# Patient Record
Sex: MALE | Race: WHITE | URBAN - METROPOLITAN AREA
[De-identification: names, ages, dates, MRNs, and addresses within clinical notes are randomized per-mention and may not be internally consistent; named-entity substitution may affect disease eponyms.]

---

## 2021-02-20 ENCOUNTER — IMMUNIZATION (OUTPATIENT)
Dept: PRIMARY CARE CLINIC | Age: 76
End: 2021-02-20
Payer: MEDICARE

## 2021-02-20 PROCEDURE — 0001A COVID-19, PFIZER VACCINE 30MCG/0.3ML DOSE: CPT | Performed by: NURSE PRACTITIONER

## 2021-02-20 PROCEDURE — 91300 COVID-19, PFIZER VACCINE 30MCG/0.3ML DOSE: CPT | Performed by: NURSE PRACTITIONER

## 2021-03-11 ENCOUNTER — IMMUNIZATION (OUTPATIENT)
Dept: PRIMARY CARE CLINIC | Age: 76
End: 2021-03-11
Payer: MEDICARE

## 2021-03-11 PROCEDURE — 91300 COVID-19, PFIZER VACCINE 30MCG/0.3ML DOSE: CPT | Performed by: NURSE PRACTITIONER

## 2021-03-11 PROCEDURE — 0002A COVID-19, PFIZER VACCINE 30MCG/0.3ML DOSE: CPT | Performed by: NURSE PRACTITIONER

## 2024-05-28 ENCOUNTER — HOSPITAL ENCOUNTER (INPATIENT)
Age: 79
LOS: 3 days | Discharge: HOME | DRG: 686 | End: 2024-06-01
Attending: EMERGENCY MEDICINE | Admitting: INTERNAL MEDICINE
Payer: MEDICARE

## 2024-05-28 ENCOUNTER — APPOINTMENT (OUTPATIENT)
Dept: CT IMAGING | Age: 79
DRG: 686 | End: 2024-05-28
Payer: MEDICARE

## 2024-05-28 DIAGNOSIS — D64.9 SYMPTOMATIC ANEMIA: Primary | ICD-10-CM

## 2024-05-28 DIAGNOSIS — N28.89 RENAL MASS: ICD-10-CM

## 2024-05-28 LAB
ALBUMIN SERPL-MCNC: 2.6 G/DL (ref 3.5–5.2)
ALP SERPL-CCNC: 194 U/L (ref 40–129)
ALT SERPL-CCNC: 14 U/L (ref 0–40)
ANION GAP SERPL CALCULATED.3IONS-SCNC: 12 MMOL/L (ref 7–16)
AST SERPL-CCNC: 29 U/L (ref 0–39)
BASOPHILS # BLD: 0 K/UL (ref 0–0.2)
BASOPHILS NFR BLD: 0 % (ref 0–2)
BILIRUB SERPL-MCNC: 0.6 MG/DL (ref 0–1.2)
BNP SERPL-MCNC: 3367 PG/ML (ref 0–450)
BUN SERPL-MCNC: 17 MG/DL (ref 6–23)
CALCIUM SERPL-MCNC: 10 MG/DL (ref 8.6–10.2)
CHLORIDE SERPL-SCNC: 97 MMOL/L (ref 98–107)
CO2 SERPL-SCNC: 23 MMOL/L (ref 22–29)
CREAT SERPL-MCNC: 1 MG/DL (ref 0.7–1.2)
EOSINOPHIL # BLD: 0.08 K/UL (ref 0.05–0.5)
EOSINOPHILS RELATIVE PERCENT: 1 % (ref 0–6)
ERYTHROCYTE [DISTWIDTH] IN BLOOD BY AUTOMATED COUNT: 18.9 % (ref 11.5–15)
GFR, ESTIMATED: 74 ML/MIN/1.73M2
GLUCOSE SERPL-MCNC: 114 MG/DL (ref 74–99)
HCT VFR BLD AUTO: 27.7 % (ref 37–54)
HGB BLD-MCNC: 8.3 G/DL (ref 12.5–16.5)
LACTATE BLDV-SCNC: 2 MMOL/L (ref 0.5–2.2)
LIPASE SERPL-CCNC: 15 U/L (ref 13–60)
LYMPHOCYTES NFR BLD: 0.62 K/UL (ref 1.5–4)
LYMPHOCYTES RELATIVE PERCENT: 7 % (ref 20–42)
MAGNESIUM SERPL-MCNC: 1.7 MG/DL (ref 1.6–2.6)
MCH RBC QN AUTO: 26.8 PG (ref 26–35)
MCHC RBC AUTO-ENTMCNC: 30 G/DL (ref 32–34.5)
MCV RBC AUTO: 89.4 FL (ref 80–99.9)
MONOCYTES NFR BLD: 0.39 K/UL (ref 0.1–0.95)
MONOCYTES NFR BLD: 4 % (ref 2–12)
NEUTROPHILS NFR BLD: 88 % (ref 43–80)
NEUTS SEG NFR BLD: 7.82 K/UL (ref 1.8–7.3)
PLATELET CONFIRMATION: NORMAL
PLATELET, FLUORESCENCE: 233 K/UL (ref 130–450)
PMV BLD AUTO: 9.3 FL (ref 7–12)
POTASSIUM SERPL-SCNC: 5 MMOL/L (ref 3.5–5)
PROT SERPL-MCNC: 5.5 G/DL (ref 6.4–8.3)
RBC # BLD AUTO: 3.1 M/UL (ref 3.8–5.8)
RBC # BLD: ABNORMAL 10*6/UL
SODIUM SERPL-SCNC: 132 MMOL/L (ref 132–146)
TROPONIN I SERPL HS-MCNC: 63 NG/L (ref 0–11)
TROPONIN I SERPL HS-MCNC: 70 NG/L (ref 0–11)
WBC OTHER # BLD: 8.9 K/UL (ref 4.5–11.5)

## 2024-05-28 PROCEDURE — 0TB03ZX EXCISION OF RIGHT KIDNEY, PERCUTANEOUS APPROACH, DIAGNOSTIC: ICD-10-PCS | Performed by: RADIOLOGY

## 2024-05-28 PROCEDURE — 83605 ASSAY OF LACTIC ACID: CPT

## 2024-05-28 PROCEDURE — 71275 CT ANGIOGRAPHY CHEST: CPT

## 2024-05-28 PROCEDURE — 83690 ASSAY OF LIPASE: CPT

## 2024-05-28 PROCEDURE — 99285 EMERGENCY DEPT VISIT HI MDM: CPT

## 2024-05-28 PROCEDURE — 80053 COMPREHEN METABOLIC PANEL: CPT

## 2024-05-28 PROCEDURE — 86901 BLOOD TYPING SEROLOGIC RH(D): CPT

## 2024-05-28 PROCEDURE — 84153 ASSAY OF PSA TOTAL: CPT

## 2024-05-28 PROCEDURE — 85025 COMPLETE CBC W/AUTO DIFF WBC: CPT

## 2024-05-28 PROCEDURE — 74177 CT ABD & PELVIS W/CONTRAST: CPT

## 2024-05-28 PROCEDURE — 6360000004 HC RX CONTRAST MEDICATION: Performed by: RADIOLOGY

## 2024-05-28 PROCEDURE — 86923 COMPATIBILITY TEST ELECTRIC: CPT

## 2024-05-28 PROCEDURE — 86900 BLOOD TYPING SEROLOGIC ABO: CPT

## 2024-05-28 PROCEDURE — 83880 ASSAY OF NATRIURETIC PEPTIDE: CPT

## 2024-05-28 PROCEDURE — 70450 CT HEAD/BRAIN W/O DYE: CPT

## 2024-05-28 PROCEDURE — 93005 ELECTROCARDIOGRAM TRACING: CPT

## 2024-05-28 PROCEDURE — 2580000003 HC RX 258

## 2024-05-28 PROCEDURE — 83735 ASSAY OF MAGNESIUM: CPT

## 2024-05-28 PROCEDURE — 84484 ASSAY OF TROPONIN QUANT: CPT

## 2024-05-28 PROCEDURE — 86850 RBC ANTIBODY SCREEN: CPT

## 2024-05-28 PROCEDURE — 96360 HYDRATION IV INFUSION INIT: CPT

## 2024-05-28 RX ORDER — 0.9 % SODIUM CHLORIDE 0.9 %
1000 INTRAVENOUS SOLUTION INTRAVENOUS ONCE
Status: DISCONTINUED | OUTPATIENT
Start: 2024-05-28 | End: 2024-05-28

## 2024-05-28 RX ORDER — SODIUM CHLORIDE 9 MG/ML
INJECTION, SOLUTION INTRAVENOUS PRN
Status: DISCONTINUED | OUTPATIENT
Start: 2024-05-28 | End: 2024-06-01 | Stop reason: HOSPADM

## 2024-05-28 RX ORDER — SODIUM CHLORIDE 9 MG/ML
INJECTION, SOLUTION INTRAVENOUS
Status: COMPLETED
Start: 2024-05-28 | End: 2024-05-28

## 2024-05-28 RX ORDER — 0.9 % SODIUM CHLORIDE 0.9 %
500 INTRAVENOUS SOLUTION INTRAVENOUS ONCE
Status: COMPLETED | OUTPATIENT
Start: 2024-05-28 | End: 2024-05-28

## 2024-05-28 RX ADMIN — SODIUM CHLORIDE 500 ML: 9 INJECTION, SOLUTION INTRAVENOUS at 22:12

## 2024-05-28 RX ADMIN — IOPAMIDOL 70 ML: 755 INJECTION, SOLUTION INTRAVENOUS at 23:04

## 2024-05-28 RX ADMIN — Medication 500 ML: at 22:12

## 2024-05-28 ASSESSMENT — ENCOUNTER SYMPTOMS
EYE REDNESS: 0
BACK PAIN: 0
PHOTOPHOBIA: 0
EYE DISCHARGE: 0
TROUBLE SWALLOWING: 0
DIARRHEA: 0
WHEEZING: 0
NAUSEA: 1
ABDOMINAL PAIN: 0
VOMITING: 1
SHORTNESS OF BREATH: 0
COUGH: 0
SORE THROAT: 0
VOICE CHANGE: 0
BLOOD IN STOOL: 0
RHINORRHEA: 0

## 2024-05-28 ASSESSMENT — PAIN - FUNCTIONAL ASSESSMENT: PAIN_FUNCTIONAL_ASSESSMENT: NONE - DENIES PAIN

## 2024-05-28 ASSESSMENT — LIFESTYLE VARIABLES: HOW OFTEN DO YOU HAVE A DRINK CONTAINING ALCOHOL: NEVER

## 2024-05-29 PROBLEM — D64.9 SYMPTOMATIC ANEMIA: Status: ACTIVE | Noted: 2024-05-29

## 2024-05-29 LAB
ALBUMIN SERPL-MCNC: 2.3 G/DL (ref 3.5–5.2)
ALP SERPL-CCNC: 173 U/L (ref 40–129)
ALT SERPL-CCNC: 10 U/L (ref 0–40)
ANION GAP SERPL CALCULATED.3IONS-SCNC: 12 MMOL/L (ref 7–16)
AST SERPL-CCNC: 19 U/L (ref 0–39)
BACTERIA URNS QL MICRO: ABNORMAL
BASOPHILS # BLD: 0.03 K/UL (ref 0–0.2)
BASOPHILS NFR BLD: 0 % (ref 0–2)
BILIRUB SERPL-MCNC: 1 MG/DL (ref 0–1.2)
BILIRUB UR QL STRIP: ABNORMAL
BUN SERPL-MCNC: 16 MG/DL (ref 6–23)
CALCIUM SERPL-MCNC: 9.5 MG/DL (ref 8.6–10.2)
CEA SERPL-MCNC: 3 NG/ML (ref 0–5.2)
CHLORIDE SERPL-SCNC: 102 MMOL/L (ref 98–107)
CLARITY UR: ABNORMAL
CO2 SERPL-SCNC: 22 MMOL/L (ref 22–29)
COLOR UR: ABNORMAL
CREAT SERPL-MCNC: 1 MG/DL (ref 0.7–1.2)
EKG ATRIAL RATE: 104 BPM
EKG P AXIS: 65 DEGREES
EKG P-R INTERVAL: 162 MS
EKG Q-T INTERVAL: 330 MS
EKG QRS DURATION: 80 MS
EKG QTC CALCULATION (BAZETT): 433 MS
EKG R AXIS: 27 DEGREES
EKG T AXIS: 66 DEGREES
EKG VENTRICULAR RATE: 104 BPM
EOSINOPHIL # BLD: 0.15 K/UL (ref 0.05–0.5)
EOSINOPHILS RELATIVE PERCENT: 2 % (ref 0–6)
EPI CELLS #/AREA URNS HPF: ABNORMAL /HPF
ERYTHROCYTE [DISTWIDTH] IN BLOOD BY AUTOMATED COUNT: 17.4 % (ref 11.5–15)
ERYTHROCYTE [SEDIMENTATION RATE] IN BLOOD BY WESTERGREN METHOD: 28 MM/HR (ref 0–15)
FERRITIN SERPL-MCNC: 2886 NG/ML
FOLATE SERPL-MCNC: 16 NG/ML (ref 4.8–24.2)
GFR, ESTIMATED: 77 ML/MIN/1.73M2
GLUCOSE BLD-MCNC: 106 MG/DL (ref 74–99)
GLUCOSE BLD-MCNC: 88 MG/DL (ref 74–99)
GLUCOSE SERPL-MCNC: 86 MG/DL (ref 74–99)
GLUCOSE UR STRIP-MCNC: NEGATIVE MG/DL
HCT VFR BLD AUTO: 28.1 % (ref 37–54)
HCT VFR BLD AUTO: 28.5 % (ref 37–54)
HCT VFR BLD AUTO: 31.8 % (ref 37–54)
HGB BLD-MCNC: 9 G/DL (ref 12.5–16.5)
HGB BLD-MCNC: 9.2 G/DL (ref 12.5–16.5)
HGB BLD-MCNC: 9.9 G/DL (ref 12.5–16.5)
HGB UR QL STRIP.AUTO: ABNORMAL
IMM GRANULOCYTES # BLD AUTO: 0.07 K/UL (ref 0–0.58)
IMM GRANULOCYTES NFR BLD: 1 % (ref 0–5)
INR PPP: 1.3
IRON SATN MFR SERPL: 43 % (ref 20–55)
IRON SERPL-MCNC: 38 UG/DL (ref 59–158)
KETONES UR STRIP-MCNC: ABNORMAL MG/DL
LEUKOCYTE ESTERASE UR QL STRIP: NEGATIVE
LYMPHOCYTES NFR BLD: 1 K/UL (ref 1.5–4)
LYMPHOCYTES RELATIVE PERCENT: 11 % (ref 20–42)
MAGNESIUM SERPL-MCNC: 1.7 MG/DL (ref 1.6–2.6)
MCH RBC QN AUTO: 28.5 PG (ref 26–35)
MCHC RBC AUTO-ENTMCNC: 32.3 G/DL (ref 32–34.5)
MCV RBC AUTO: 88.2 FL (ref 80–99.9)
MONOCYTES NFR BLD: 1.01 K/UL (ref 0.1–0.95)
MONOCYTES NFR BLD: 11 % (ref 2–12)
NEUTROPHILS NFR BLD: 75 % (ref 43–80)
NEUTS SEG NFR BLD: 6.57 K/UL (ref 1.8–7.3)
NITRITE UR QL STRIP: POSITIVE
PH UR STRIP: 6.5 [PH] (ref 5–9)
PHOSPHATE SERPL-MCNC: 2.5 MG/DL (ref 2.5–4.5)
PLATELET # BLD AUTO: 194 K/UL (ref 130–450)
PMV BLD AUTO: 9.4 FL (ref 7–12)
POTASSIUM SERPL-SCNC: 4.2 MMOL/L (ref 3.5–5)
PROCALCITONIN SERPL-MCNC: 0.38 NG/ML (ref 0–0.08)
PROT SERPL-MCNC: 5 G/DL (ref 6.4–8.3)
PROT UR STRIP-MCNC: 30 MG/DL
PROTHROMBIN TIME: 14.6 SEC (ref 9.3–12.4)
PSA SERPL-MCNC: 0.6 NG/ML (ref 0–4)
RBC # BLD AUTO: 3.23 M/UL (ref 3.8–5.8)
RBC #/AREA URNS HPF: ABNORMAL /HPF
SODIUM SERPL-SCNC: 136 MMOL/L (ref 132–146)
SP GR UR STRIP: 1.01 (ref 1–1.03)
T4 FREE SERPL-MCNC: 1.3 NG/DL (ref 0.9–1.7)
TIBC SERPL-MCNC: 88 UG/DL (ref 250–450)
TSH SERPL DL<=0.05 MIU/L-ACNC: 4.1 UIU/ML (ref 0.27–4.2)
UROBILINOGEN UR STRIP-ACNC: 1 EU/DL (ref 0–1)
VIT B12 SERPL-MCNC: 721 PG/ML (ref 211–946)
WBC #/AREA URNS HPF: ABNORMAL /HPF
WBC OTHER # BLD: 8.8 K/UL (ref 4.5–11.5)

## 2024-05-29 PROCEDURE — 83735 ASSAY OF MAGNESIUM: CPT

## 2024-05-29 PROCEDURE — 6370000000 HC RX 637 (ALT 250 FOR IP)

## 2024-05-29 PROCEDURE — C9113 INJ PANTOPRAZOLE SODIUM, VIA: HCPCS | Performed by: INTERNAL MEDICINE

## 2024-05-29 PROCEDURE — 81001 URINALYSIS AUTO W/SCOPE: CPT

## 2024-05-29 PROCEDURE — 87088 URINE BACTERIA CULTURE: CPT

## 2024-05-29 PROCEDURE — 82746 ASSAY OF FOLIC ACID SERUM: CPT

## 2024-05-29 PROCEDURE — 36415 COLL VENOUS BLD VENIPUNCTURE: CPT

## 2024-05-29 PROCEDURE — 6370000000 HC RX 637 (ALT 250 FOR IP): Performed by: INTERNAL MEDICINE

## 2024-05-29 PROCEDURE — 85018 HEMOGLOBIN: CPT

## 2024-05-29 PROCEDURE — 93010 ELECTROCARDIOGRAM REPORT: CPT | Performed by: INTERNAL MEDICINE

## 2024-05-29 PROCEDURE — 84443 ASSAY THYROID STIM HORMONE: CPT

## 2024-05-29 PROCEDURE — 84145 PROCALCITONIN (PCT): CPT

## 2024-05-29 PROCEDURE — 36430 TRANSFUSION BLD/BLD COMPNT: CPT

## 2024-05-29 PROCEDURE — 85652 RBC SED RATE AUTOMATED: CPT

## 2024-05-29 PROCEDURE — 82962 GLUCOSE BLOOD TEST: CPT

## 2024-05-29 PROCEDURE — 85025 COMPLETE CBC W/AUTO DIFF WBC: CPT

## 2024-05-29 PROCEDURE — 82607 VITAMIN B-12: CPT

## 2024-05-29 PROCEDURE — 85014 HEMATOCRIT: CPT

## 2024-05-29 PROCEDURE — 82378 CARCINOEMBRYONIC ANTIGEN: CPT

## 2024-05-29 PROCEDURE — 6360000002 HC RX W HCPCS: Performed by: INTERNAL MEDICINE

## 2024-05-29 PROCEDURE — 83540 ASSAY OF IRON: CPT

## 2024-05-29 PROCEDURE — 84100 ASSAY OF PHOSPHORUS: CPT

## 2024-05-29 PROCEDURE — 2580000003 HC RX 258: Performed by: INTERNAL MEDICINE

## 2024-05-29 PROCEDURE — 87086 URINE CULTURE/COLONY COUNT: CPT

## 2024-05-29 PROCEDURE — 84439 ASSAY OF FREE THYROXINE: CPT

## 2024-05-29 PROCEDURE — 88112 CYTOPATH CELL ENHANCE TECH: CPT

## 2024-05-29 PROCEDURE — 80053 COMPREHEN METABOLIC PANEL: CPT

## 2024-05-29 PROCEDURE — 2580000003 HC RX 258

## 2024-05-29 PROCEDURE — 82728 ASSAY OF FERRITIN: CPT

## 2024-05-29 PROCEDURE — 85610 PROTHROMBIN TIME: CPT

## 2024-05-29 PROCEDURE — P9016 RBC LEUKOCYTES REDUCED: HCPCS

## 2024-05-29 PROCEDURE — 83550 IRON BINDING TEST: CPT

## 2024-05-29 PROCEDURE — 6360000002 HC RX W HCPCS

## 2024-05-29 PROCEDURE — 2060000000 HC ICU INTERMEDIATE R&B

## 2024-05-29 RX ORDER — DOXAZOSIN MESYLATE 1 MG/1
2 TABLET ORAL DAILY
Status: DISCONTINUED | OUTPATIENT
Start: 2024-05-29 | End: 2024-06-01 | Stop reason: HOSPADM

## 2024-05-29 RX ORDER — ELECTROLYTES/DEXTROSE
1 SOLUTION, ORAL ORAL PRN
Status: DISCONTINUED | OUTPATIENT
Start: 2024-05-29 | End: 2024-06-01 | Stop reason: HOSPADM

## 2024-05-29 RX ORDER — ENOXAPARIN SODIUM 100 MG/ML
30 INJECTION SUBCUTANEOUS 2 TIMES DAILY
Status: DISCONTINUED | OUTPATIENT
Start: 2024-05-29 | End: 2024-06-01 | Stop reason: HOSPADM

## 2024-05-29 RX ORDER — SODIUM CHLORIDE 0.9 % (FLUSH) 0.9 %
5-40 SYRINGE (ML) INJECTION PRN
Status: DISCONTINUED | OUTPATIENT
Start: 2024-05-29 | End: 2024-06-01 | Stop reason: HOSPADM

## 2024-05-29 RX ORDER — POLYETHYLENE GLYCOL 3350 17 G/17G
17 POWDER, FOR SOLUTION ORAL DAILY PRN
Status: DISCONTINUED | OUTPATIENT
Start: 2024-05-29 | End: 2024-06-01 | Stop reason: HOSPADM

## 2024-05-29 RX ORDER — INSULIN LISPRO 100 [IU]/ML
3 INJECTION, SUSPENSION SUBCUTANEOUS NIGHTLY
Status: DISCONTINUED | OUTPATIENT
Start: 2024-05-29 | End: 2024-06-01 | Stop reason: HOSPADM

## 2024-05-29 RX ORDER — MAGNESIUM SULFATE IN WATER 40 MG/ML
2000 INJECTION, SOLUTION INTRAVENOUS PRN
Status: DISCONTINUED | OUTPATIENT
Start: 2024-05-29 | End: 2024-06-01 | Stop reason: HOSPADM

## 2024-05-29 RX ORDER — GLUCAGON 1 MG/ML
1 KIT INJECTION PRN
Status: DISCONTINUED | OUTPATIENT
Start: 2024-05-29 | End: 2024-06-01 | Stop reason: HOSPADM

## 2024-05-29 RX ORDER — LOSARTAN POTASSIUM 50 MG/1
50 TABLET ORAL DAILY
Status: DISCONTINUED | OUTPATIENT
Start: 2024-05-29 | End: 2024-06-01 | Stop reason: HOSPADM

## 2024-05-29 RX ORDER — INSULIN LISPRO 100 [IU]/ML
4 INJECTION, SUSPENSION SUBCUTANEOUS 2 TIMES DAILY WITH MEALS
Status: DISCONTINUED | OUTPATIENT
Start: 2024-05-29 | End: 2024-06-01 | Stop reason: HOSPADM

## 2024-05-29 RX ORDER — ENOXAPARIN SODIUM 100 MG/ML
40 INJECTION SUBCUTANEOUS DAILY
Status: DISCONTINUED | OUTPATIENT
Start: 2024-05-29 | End: 2024-05-29

## 2024-05-29 RX ORDER — LOSARTAN POTASSIUM AND HYDROCHLOROTHIAZIDE 12.5; 5 MG/1; MG/1
1 TABLET ORAL DAILY
Status: DISCONTINUED | OUTPATIENT
Start: 2024-05-29 | End: 2024-05-29

## 2024-05-29 RX ORDER — POTASSIUM CHLORIDE 7.45 MG/ML
10 INJECTION INTRAVENOUS PRN
Status: DISCONTINUED | OUTPATIENT
Start: 2024-05-29 | End: 2024-06-01 | Stop reason: HOSPADM

## 2024-05-29 RX ORDER — SODIUM CHLORIDE 9 MG/ML
INJECTION, SOLUTION INTRAVENOUS PRN
Status: DISCONTINUED | OUTPATIENT
Start: 2024-05-29 | End: 2024-06-01 | Stop reason: HOSPADM

## 2024-05-29 RX ORDER — SODIUM CHLORIDE 0.9 % (FLUSH) 0.9 %
5-40 SYRINGE (ML) INJECTION EVERY 12 HOURS SCHEDULED
Status: DISCONTINUED | OUTPATIENT
Start: 2024-05-29 | End: 2024-06-01 | Stop reason: HOSPADM

## 2024-05-29 RX ORDER — PANTOPRAZOLE SODIUM 40 MG/10ML
40 INJECTION, POWDER, LYOPHILIZED, FOR SOLUTION INTRAVENOUS 2 TIMES DAILY
Status: DISCONTINUED | OUTPATIENT
Start: 2024-05-29 | End: 2024-06-01 | Stop reason: HOSPADM

## 2024-05-29 RX ORDER — ACETAMINOPHEN 650 MG/1
650 SUPPOSITORY RECTAL EVERY 6 HOURS PRN
Status: DISCONTINUED | OUTPATIENT
Start: 2024-05-29 | End: 2024-06-01 | Stop reason: HOSPADM

## 2024-05-29 RX ORDER — SODIUM CHLORIDE 9 MG/ML
INJECTION, SOLUTION INTRAVENOUS CONTINUOUS
Status: DISCONTINUED | OUTPATIENT
Start: 2024-05-29 | End: 2024-06-01 | Stop reason: HOSPADM

## 2024-05-29 RX ORDER — COLCHICINE 0.6 MG/1
0.6 TABLET ORAL DAILY
Status: DISCONTINUED | OUTPATIENT
Start: 2024-05-29 | End: 2024-06-01 | Stop reason: HOSPADM

## 2024-05-29 RX ORDER — ATORVASTATIN CALCIUM 10 MG/1
10 TABLET, FILM COATED ORAL DAILY
Status: DISCONTINUED | OUTPATIENT
Start: 2024-05-29 | End: 2024-06-01 | Stop reason: HOSPADM

## 2024-05-29 RX ORDER — ALLOPURINOL 100 MG/1
100 TABLET ORAL DAILY
Status: DISCONTINUED | OUTPATIENT
Start: 2024-05-29 | End: 2024-06-01 | Stop reason: HOSPADM

## 2024-05-29 RX ORDER — POTASSIUM CHLORIDE 20 MEQ/1
40 TABLET, EXTENDED RELEASE ORAL PRN
Status: DISCONTINUED | OUTPATIENT
Start: 2024-05-29 | End: 2024-06-01 | Stop reason: HOSPADM

## 2024-05-29 RX ORDER — DEXTROSE MONOHYDRATE 100 MG/ML
INJECTION, SOLUTION INTRAVENOUS CONTINUOUS PRN
Status: DISCONTINUED | OUTPATIENT
Start: 2024-05-29 | End: 2024-06-01 | Stop reason: HOSPADM

## 2024-05-29 RX ORDER — LEVOFLOXACIN 5 MG/ML
250 INJECTION, SOLUTION INTRAVENOUS EVERY 24 HOURS
Status: DISCONTINUED | OUTPATIENT
Start: 2024-05-29 | End: 2024-06-01 | Stop reason: HOSPADM

## 2024-05-29 RX ORDER — ACETAMINOPHEN 325 MG/1
650 TABLET ORAL EVERY 6 HOURS PRN
Status: DISCONTINUED | OUTPATIENT
Start: 2024-05-29 | End: 2024-06-01 | Stop reason: HOSPADM

## 2024-05-29 RX ORDER — METOCLOPRAMIDE HYDROCHLORIDE 5 MG/5ML
10 SOLUTION ORAL 3 TIMES DAILY
COMMUNITY
Start: 2024-05-17

## 2024-05-29 RX ORDER — HYDROCHLOROTHIAZIDE 12.5 MG/1
12.5 TABLET ORAL DAILY
Status: DISCONTINUED | OUTPATIENT
Start: 2024-05-29 | End: 2024-06-01 | Stop reason: HOSPADM

## 2024-05-29 RX ORDER — MELOXICAM 7.5 MG/1
7.5 TABLET ORAL DAILY
Status: DISCONTINUED | OUTPATIENT
Start: 2024-05-29 | End: 2024-06-01 | Stop reason: HOSPADM

## 2024-05-29 RX ADMIN — SODIUM CHLORIDE, PRESERVATIVE FREE 10 ML: 5 INJECTION INTRAVENOUS at 21:30

## 2024-05-29 RX ADMIN — PANTOPRAZOLE SODIUM 40 MG: 40 INJECTION, POWDER, FOR SOLUTION INTRAVENOUS at 06:35

## 2024-05-29 RX ADMIN — PANTOPRAZOLE SODIUM 40 MG: 40 INJECTION, POWDER, FOR SOLUTION INTRAVENOUS at 11:19

## 2024-05-29 RX ADMIN — DOXAZOSIN 2 MG: 1 TABLET ORAL at 15:31

## 2024-05-29 RX ADMIN — PANTOPRAZOLE SODIUM 40 MG: 40 INJECTION, POWDER, FOR SOLUTION INTRAVENOUS at 21:29

## 2024-05-29 RX ADMIN — SODIUM CHLORIDE: 9 INJECTION, SOLUTION INTRAVENOUS at 08:48

## 2024-05-29 RX ADMIN — Medication 1 EACH: at 16:59

## 2024-05-29 RX ADMIN — COLCHICINE 0.3 MG: 0.6 TABLET, FILM COATED ORAL at 08:51

## 2024-05-29 RX ADMIN — ENOXAPARIN SODIUM 30 MG: 100 INJECTION SUBCUTANEOUS at 21:29

## 2024-05-29 RX ADMIN — METOPROLOL TARTRATE 25 MG: 25 TABLET, FILM COATED ORAL at 21:30

## 2024-05-29 RX ADMIN — INSULIN LISPRO 4 UNITS: 100 INJECTION, SUSPENSION SUBCUTANEOUS at 16:56

## 2024-05-29 NOTE — PLAN OF CARE
Problem: Discharge Planning  Goal: Discharge to home or other facility with appropriate resources  Outcome: Progressing  Flowsheets (Taken 5/29/2024 1806)  Discharge to home or other facility with appropriate resources:   Identify barriers to discharge with patient and caregiver   Arrange for needed discharge resources and transportation as appropriate   Identify discharge learning needs (meds, wound care, etc)     Problem: Safety - Adult  Goal: Free from fall injury  Outcome: Progressing  Flowsheets (Taken 5/29/2024 1806)  Free From Fall Injury:   Instruct family/caregiver on patient safety   Based on caregiver fall risk screen, instruct family/caregiver to ask for assistance with transferring infant if caregiver noted to have fall risk factors     Problem: ABCDS Injury Assessment  Goal: Absence of physical injury  Outcome: Progressing  Flowsheets (Taken 5/29/2024 1806)  Absence of Physical Injury: Implement safety measures based on patient assessment

## 2024-05-29 NOTE — H&P
Department of Internal Medicine  History and Physical    PCP: Emiliano Schroeder DO  Admitting Physician: Dr. De La O/Nick  Consultants:   Date of Service: 5/28/2024    CHIEF COMPLAINT:  lightheadedness    HISTORY OF PRESENT ILLNESS:      Patient is 78-year-old male who presented to the ED with lightheadedness and dizziness.  Patient has a history of anemia.  States that recently he has been following with [facility] for anemia.  States that his H&H/hemoglobin was around 5 about 3 months ago.  He received iron infusions and his H&H improved to around 6.8.  However he is unsure of his most recent H&H.  He has followed up with GI for anemia as well as other GI issues.  He states that he had EGD colonoscopy and capsule endoscopy.  States that he has been having trouble with oral intake.  States that if he eats more than 2 meals he is unable to keep food down.  States he has been diagnosed with gastroparesis versus disorder of pyloric sphincter.  He recently stopped NSAIDs and aspirin about 2 weeks ago as it may have been causing his underlying issues.  He has been prescribed Reglan.  He does admit to right lower quadrant abdominal discomfort.  Denies any fever or chills.  States he lives with his wife and uses a walker and cane at home.    PAST MEDICAL Hx:  Past Medical History:   Diagnosis Date    Asthma     Diabetes mellitus (HCC)     Gout     Hyperlipidemia     Hypertension        PAST SURGICAL Hx:   History reviewed. No pertinent surgical history.    FAMILY Hx:  History reviewed. No pertinent family history.    HOME MEDICATIONS:  Prior to Admission medications    Medication Sig Start Date End Date Taking? Authorizing Provider   losartan-hydrochlorothiazide (HYZAAR) 50-12.5 MG per tablet Take 1 tablet by mouth daily    ProviderReynold MD   simvastatin (ZOCOR) 10 MG tablet Take 10 mg by mouth nightly    ProviderReynold MD   nabumetone (RELAFEN) 500 MG tablet Take 500 mg by mouth 2 times daily     and no murmur noted     ABDOMEN:    soft, non-distended, non-tender,     MUSCULOSKELETAL:    There is no redness, warmth, or swelling of the joints.      NEUROLOGIC:    Awake, alert, oriented to name, place and time.       SKIN:    No bruising or bleeding.  No redness, warmth, or swelling     EXTREMITIES:    Peripheral pulses present.  No edema, cyanosis, or swelling.     LINES/CATHETERS        LABORATORY DATA:  CBC with Differential:    Lab Results   Component Value Date/Time    WBC 8.9 05/28/2024 09:33 PM    RBC 3.10 05/28/2024 09:33 PM    HGB 8.3 05/28/2024 09:33 PM    HCT 27.7 05/28/2024 09:33 PM     06/26/2016 10:55 AM    MCV 89.4 05/28/2024 09:33 PM    MCH 26.8 05/28/2024 09:33 PM    MCHC 30.0 05/28/2024 09:33 PM    RDW 18.9 05/28/2024 09:33 PM    LYMPHOPCT 7 05/28/2024 09:33 PM    MONOPCT 4 05/28/2024 09:33 PM    EOSPCT 1 05/28/2024 09:33 PM    BASOPCT 0 05/28/2024 09:33 PM    MONOSABS 0.39 05/28/2024 09:33 PM    EOSABS 0.08 05/28/2024 09:33 PM    BASOSABS 0.00 05/28/2024 09:33 PM     CMP:    Lab Results   Component Value Date/Time     05/28/2024 09:33 PM    K 5.0 05/28/2024 09:33 PM    CL 97 05/28/2024 09:33 PM    CO2 23 05/28/2024 09:33 PM    BUN 17 05/28/2024 09:33 PM    CREATININE 1.0 05/28/2024 09:33 PM    GFRAA >60 06/26/2016 10:55 AM    LABGLOM 74 05/28/2024 09:33 PM    GLUCOSE 114 05/28/2024 09:33 PM    CALCIUM 10.0 05/28/2024 09:33 PM    BILITOT 0.6 05/28/2024 09:33 PM    ALKPHOS 194 05/28/2024 09:33 PM    AST 29 05/28/2024 09:33 PM    ALT 14 05/28/2024 09:33 PM       ASSESSMENT/PLAN:  Anemia  Right renal mass possibly renal cell carcinoma with large tumor thrombus extending into the inferior vena cava  Mediastinal and bilateral supraclavicular lymphadenopathy concerning for metastatic disease  Prominent aortocaval lymph nodes up to 9 mm   Elevated troponin  Small bilateral pleural effusions  Atherosclerotic disease  Chronic left basal ganglia lacunar infarct  Asthma  Diabetes  mellitus  Hyperlipidemia  Hypertension  Cholelithiasis  Small ascites  Chronic interstitial lung disease  moderate cerebral atrophy    Patient presented with lightheadedness and dizziness.  Patient found to have acute anemia compared to blood work several years ago.  He does follow with [facility] and GI as an outpatient.  He has received iron infusions and had a EGD, capsule endoscopy, colonoscopy as an outpatient without identifiable source of anemia.  Will continue to monitor his H&H in the ED.  Patient will be placed on IV Protonix.           Carolyn Brady DO  2:55 AM  5/29/2024    Electronically signed by Carolyn Brady DO on 5/29/24 at 2:55 AM EDT   Had previous egd pyloric sphincter  Reglan used this  Gastroparesis  Asthma  Cad sp cabg   Dr ramirez  Was taking- nsaid but taken offf of it  Diminished appetite   Full fast   If ate third meal would bring it up  Rlq abdominal pain  Lightheadedness   Lives with wife  Uses walker and cane  Last egd 2 weeeks ago   Stopped nsaid and aspirinu

## 2024-05-29 NOTE — CONSULTS
5/29/2024 12:58 PM  Service: Urology  Group: [facility] urology ([place])    Morris Mayo  [id number]     Chief Complaint:    High suspicion of widely disseminated metastatic renal cell carcinoma    History of Present Illness:      The patient is a 78 y.o. male patient who presents with dizziness and lightheadedness.  He had been assessed for anemia and his hemoglobin had been around 5 about 3 months ago he had iron infusions his H&H is improved to about 6.8.  He has had difficulty with eating and was found to have a possible gastroparesis.  His electrolytes are normal.  His blood sugars are relatively normal.  proBNP is 3300.  His white count is 8800 and his hemoglobin is 9 platelet count is normal his CAT scan suggested 9 mm right renal solid mass with thrombus in the vena cava.  He has mediastinal enlarged lymph nodes compatible with metastatic renal cell carcinoma chest CAT scan suggests bilateral small pleural effusion but extensive lymphadenopathy particularly in the mediastinum  The patient has had difficulty swallowing and has been diagnosed with gastroparesis but he has lost 50 pounds in the 6-month period.  He has nocturia about every 2 hours no gross hematuria just until recently had 2 single episodes 1 in February and 1 more recent no history of calculus disease.  He is on Hytrin 5 mg a day which is to help his voiding symptoms.  He has had a complete gastrointestinal evaluation for the cause of his blood loss anemia.      Past Medical History:   Diagnosis Date    Asthma     Diabetes mellitus (HCC)     Gout     Hyperlipidemia     Hypertension          History reviewed. No pertinent surgical history.    Medications Prior to Admission:    Medications Prior to Admission: losartan-hydrochlorothiazide (HYZAAR) 50-12.5 MG per tablet, Take 1 tablet by mouth daily  simvastatin (ZOCOR) 10 MG tablet, Take 10 mg by mouth nightly  nabumetone (RELAFEN) 500 MG tablet, Take 500 mg by mouth 2 times daily  terazosin  gout  : As above in the HPI, otherwise negative  Hematological he had a symptomatic anemia treated with iron and blood transfusion    Physical Exam:     Vitals:  /69   Pulse 100   Temp 97.4 °F (36.3 °C) (Oral)   Resp 16   Ht 1.676 m (5' 6\")   Wt 54.4 kg (120 lb)   SpO2 99%   BMI 19.37 kg/m²     General:  Awake, alert, oriented X 3.  Well developed, well nourished, well groomed.  No apparent distress.  HEENT:  Normocephalic, atraumatic.  Pupils equal, round.  No scleral icterus.  No conjunctival injection.  Normal lips, teeth, and gums.  No nasal discharge.  Neck:  Supple, no masses.  Heart:  RRR  Lungs:  No audible wheezing.  Respirations symmetric and non-labored.  Abdomen:  soft, nontender, no masses, no organomegaly, no peritoneal signs  Extremities:  No clubbing, cyanosis, or edema  Skin:  Warm and dry, no open lesions or rashes  Neuro: There are no motor or sensory deficits in the 4 quadrant extremities   Rectal: deferred  Genitalia: Circumcised testes epididymis and cord normal    Labs:     Recent Labs     05/28/24 2133 05/29/24  0402 05/29/24  0558   WBC 8.9 8.8  --    RBC 3.10* 3.23*  --    HGB 8.3* 9.2* 9.0*   HCT 27.7* 28.5* 28.1*   MCV 89.4 88.2  --    MCH 26.8 28.5  --    MCHC 30.0* 32.3  --    RDW 18.9* 17.4*  --    PLT  --  194  --    MPV 9.3 9.4  --          Recent Labs     05/28/24 2133 05/29/24  0402   CREATININE 1.0 1.0         Assessment:  Morris Mayo 78 y.o. male     I did talk to the patient and his daughter and advised him that he had a pretty extensive carcinoma is not symptomatic from the vena caval thrombosis at this time and no surgery would be done.  I think he does need a biopsy proven diagnosis and then he could have an oncology consultation to be considered for medical therapy if he would improve with massive cytoreduction nephrectomy and thrombectomy could be considered at a much later time    Plan:    Will also get a baseline PSA but I will order a percutaneous

## 2024-05-29 NOTE — PROGRESS NOTES
Patient has medications with him at bedside. Patient insists on keeping them with him in his bag at bedside. RN explained that he is to not take any medications at home. RN also notified Dr. De La O at bedside.

## 2024-05-29 NOTE — PROGRESS NOTES
4 Eyes Skin Assessment     NAME:  Morris Mayo  YOB: 1945  MEDICAL RECORD NUMBER:  [id number]    The patient is being assessed for  Admission    I agree that at least one RN has performed a thorough Head to Toe Skin Assessment on the patient. ALL assessment sites listed below have been assessed.      Areas assessed by both nurses:    Head, Face, Ears, Shoulders, Back, Chest, Arms, Elbows, Hands, Sacrum. Buttock, Coccyx, Ischium, Legs. Feet and Heels, and Under Medical Devices         Does the Patient have a Wound? No noted wound(s)       Tono Prevention initiated by RN: No  Wound Care Orders initiated by RN: No    Pressure Injury (Stage 3,4, Unstageable, DTI, NWPT, and Complex wounds) if present, place Wound referral order by RN under : No    New Ostomies, if present place, Ostomy referral order under : No     Nurse 1 eSignature: Electronically signed by Karthikeyan Clemens RN on 5/29/24 at 3:27 PM EDT    **SHARE this note so that the co-signing nurse can place an eSignature**    Nurse 2 eSignature: Electronically signed by Linda Garcia RN on 5/29/24 at 3:29 PM EDT

## 2024-05-29 NOTE — ED PROVIDER NOTES
[facility] EMERGENCY DEPARTMENT  EMERGENCY DEPARTMENT ENCOUNTER      Pt Name: Morris Mayo  MRN: [id number]  Birthdate 1945  Date of evaluation: 5/28/2024  Provider: Michael Muir DO  PCP: Emiliano Schroeder DO    HPI: 78-year-old male present emerged part complaints of lightheadedness standing.  Patient reports symptoms of ongoing x 4 weeks.  Patient with previous history of CABG.  Denies any chest pain.  Reports has had decreased intake secondary to nausea, vomiting.  Patient reports no prodromal symptoms before vomiting onsets.  Denies any blood in stool or blood in urine.  Denies any difficulty with urination.  Reports was put on Reglan for nausea and vomiting.  Patient was taken off his other medications for blood pressure.  Tachycardic on initial exam no hypotension noted.  Able to follow commands move all extremities at difficulty.  No pronator drift and no muscle weakness.  Chief Complaint   Patient presents with    Hypotension     States BP has been dropping when standing, becomes dizzy and weak, symptoms last couple weeks       Review of Systems   Constitutional:  Positive for fatigue. Negative for chills and fever.   HENT:  Negative for congestion, rhinorrhea, sore throat, trouble swallowing and voice change.    Eyes:  Negative for photophobia, discharge, redness and visual disturbance.   Respiratory:  Negative for cough, shortness of breath and wheezing.    Cardiovascular:  Negative for chest pain and palpitations.   Gastrointestinal:  Positive for nausea and vomiting. Negative for abdominal pain, blood in stool and diarrhea.   Genitourinary:  Negative for dysuria, flank pain, frequency, hematuria and urgency.   Musculoskeletal:  Negative for arthralgias, back pain, neck pain and neck stiffness.   Skin:  Negative for rash and wound.   Neurological:  Positive for light-headedness. Negative for dizziness, syncope, weakness, numbness and headaches.  28.5 (L) 37.0 - 54.0 %    MCV 88.2 80.0 - 99.9 fL    MCH 28.5 26.0 - 35.0 pg    MCHC 32.3 32.0 - 34.5 g/dL    RDW 17.4 (H) 11.5 - 15.0 %    Platelets 194 130 - 450 k/uL    MPV 9.4 7.0 - 12.0 fL    Neutrophils % 75 43.0 - 80.0 %    Lymphocytes % 11 (L) 20.0 - 42.0 %    Monocytes % 11 2.0 - 12.0 %    Eosinophils % 2 0 - 6 %    Basophils % 0 0.0 - 2.0 %    Immature Granulocytes % 1 0.0 - 5.0 %    Neutrophils Absolute 6.57 1.80 - 7.30 k/uL    Lymphocytes Absolute 1.00 (L) 1.50 - 4.00 k/uL    Monocytes Absolute 1.01 (H) 0.10 - 0.95 k/uL    Eosinophils Absolute 0.15 0.05 - 0.50 k/uL    Basophils Absolute 0.03 0.00 - 0.20 k/uL    Immature Granulocytes Absolute 0.07 0.00 - 0.58 k/uL   Protime-INR   Result Value Ref Range    Protime 14.6 (H) 9.3 - 12.4 sec    INR 1.3    EKG 12 Lead   Result Value Ref Range    Ventricular Rate 104 BPM    Atrial Rate 104 BPM    P-R Interval 162 ms    QRS Duration 80 ms    Q-T Interval 330 ms    QTc Calculation (Bazett) 433 ms    P Axis 65 degrees    R Axis 27 degrees    T Axis 66 degrees   TYPE AND SCREEN   Result Value Ref Range    Blood Bank Sample Expiration 05/31/2024,2359     Arm Band Number [id number]     ABO/Rh A POSITIVE     Antibody Screen NEGATIVE     Unit Number [id number]     Component Leukocyte Reduced Red Cell     Unit Divison 00     Dispense Status Blood Bank ISSUED     Unit Issue Date/Time [id number]     Product Code Blood Bank [id number]     Blood Bank Unit Type and Rh A POS     Blood Bank ISBT Product Blood Type 6200     Blood Bank Blood Product Expiration Date 944943191401     Transfusion Status OK TO TRANSFUSE     Crossmatch Result COMPATIBLE        RADIOLOGY:  CT ABDOMEN PELVIS W IV CONTRAST Additional Contrast? None   Final Result   1. 9.0 cm right renal mass almost certainly representing renal cell carcinoma   with large tumor thrombus extending into the inferior vena cava.   2. Cholelithiasis.   3. Diverticulosis.   4. Small ascites.   5. Small bilateral pleural

## 2024-05-29 NOTE — CONSENT
Informed Consent for Blood Component Transfusion Note    I have discussed with the patient the rationale for blood component transfusion; its benefits in treating or preventing fatigue, organ damage, or death; and its risk which includes mild transfusion reactions, rare risk of blood borne infection, or more serious but rare reactions. I have discussed the alternatives to transfusion, including the risk and consequences of not receiving transfusion. The patient had an opportunity to ask questions and had agreed to proceed with transfusion of blood components.    Electronically signed by HANSA Carmona CNP on 5/29/24 at 12:11 PM EDT

## 2024-05-29 NOTE — PROGRESS NOTES
Internal Medicine Consult Note    [facility]    Paul De La O D.O., CHACHAI.                    Charanjit Romero D.O., ARGELIA Haro D.O.     Cara Gannon, MSN, APRN, NP-C  Abram Rodriguez, MSN, APRN-CNP  Matthew Casey, MSN, APRN-CNP  Rafaela Joseph, MSN APRN-CNP  Rhonda Martinez, MSN. APRN-NP-C     Primary Care Physician: Emiliano Schroeder DO   Admitting Physician:  Paul De La O DO  Admission date and time: 5/28/2024  9:02 PM    Room:  [address] [place] [address]  Admitting diagnosis: Renal mass [N28.89]  Symptomatic anemia [D64.9]    Patient Name: Morris Mayo  MRN: [id number]    Date of Service: 5/29/2024     Subjective:  Morirs is a 78 y.o. male who was seen and examined today,5/29/2024, at the bedside.  Patient is resting comfortably on room air.  He continues to complain of generalized malaise.  Hemoglobin this morning is remaining stable at 9.  He does have a 9 cm right renal mass almost certainly representing a renal cell carcinoma was a large tumor thrombus extending into the inferior vena cava.  Will consult urology.      Wife present during my examination.    Review of System:   Constitutional:   Denies fever or chills, weight loss or gain, positive fatigue and general malaise HEENT:   Denies ear pain, sore throat, sinus or eye problems.  Cardiovascular:   Denies any chest pain, irregular heartbeats, or palpitations.   Respiratory:   Denies shortness of breath, coughing, sputum production, hemoptysis, or wheezing.  Gastrointestinal:   Denies nausea, vomiting, diarrhea, or constipation.  Denies any abdominal pain.  Reports decreased appetite and weight loss  Genitourinary:    Denies any urgency, frequency, hematuria. Voiding  without difficulty.  Extremities:   Denies lower extremity swelling, edema or cyanosis.   Neurology:    Denies any headache or focal neurological deficits, Denies generalized weakness or memory difficulty.   Psch:   Denies  being anxious or depressed.  Musculoskeletal:    Denies  myalgias, joint complaints or back pain.   Integumentary:   Denies any rashes, ulcers, or excoriations.  Denies bruising.  Hematologic/Lymphatic:  Denies bruising or bleeding.  Reports chronic anemia    Physical Exam:  No intake/output data recorded.  No intake or output data in the 24 hours ending 05/29/24 1547No intake/output data recorded.  Patient Vitals for the past 96 hrs (Last 3 readings):   Weight   05/28/24 2109 54.4 kg (120 lb)     Vital Signs:   Blood pressure 129/69, pulse 100, temperature 97.4 °F (36.3 °C), temperature source Oral, resp. rate 16, height 1.676 m (5' 6\"), weight 54.4 kg (120 lb), SpO2 99 %.    General appearance:  Alert, responsive, oriented to person, place, and time. , alert, no distress.  Head:  Normocephalic. No masses, lesions or tenderness.  Eyes:  PERRLA.  EOMI.  Sclera clear.  Buccal mucosa moist.  ENT:  Ears normal. Mucosa normal.  Neck:    Supple. Trachea midline. No thyromegaly. No JVD. No bruits.  Heart:    Rhythm regular. Rate controlled.  No murmurs.  Lungs:    Symmetrical. Clear to auscultation bilaterally.  No wheezes. No rhonchi. No rales.  Abdomen:   Soft. Non-tender. Non-distended. Bowel sounds positive. No organomegaly or masses.  No pain on palpation.  Extremities:    Peripheral pulses present.  No peripheral edema.  No ulcers. No cyanosis. No clubbing.  Neurologic:    Alert x 3.  No focal deficit.  Cranial nerves grossly intact. No focal weakness.  Psych:   Behavior is normal. Mood appears normal. Speech is not rapid and/or pressured.  Musculoskeletal:   Spine ROM normal. Muscular strength intact. Gait not assessed.  Integumentary:  No rashes  Skin normal color and texture.  Genitalia/Breast:  Deferred    Medication:  Scheduled Meds:   sodium chloride flush  5-40 mL IntraVENous 2 times per day    colchicine  0.6 mg Oral Daily    pantoprazole  40 mg IntraVENous BID    levofloxacin  250 mg IntraVENous Q24H

## 2024-05-29 NOTE — PROGRESS NOTES
SPIRITUAL HEALTH SERVICES - [facility] Encounter    Name: Morris WEISS Maria Esther                  Referral: Routine Visit    Sacraments  Anointed (Last Rites): Yes  Apostolic [place]: No  Confession: No  Communion: Yes     Assessment:  Patient receptive to  visit.      Intervention:   provided spiritual support and sacramental ministry for patient.     Outcome:  Patient expressed gratitude for visit.    Plan:  Chaplains will remain available to offer spiritual and emotional support as needed.      Electronically signed by Chaplain Frederick, on 5/29/2024 at 2:51 PM.  Spiritual Care Department  [facility]  [phone]

## 2024-05-29 NOTE — PROGRESS NOTES
RN went over patient home medication list 3 times with RN. Patient expressed desire to speak with a physician regarding medications as he himself is unsure about medications and is frustrated because he already went through list in the ER. RN called Kade Casey NP to explain patients desire to speak with them. NP asked RN to go over medication list again and update home med list.

## 2024-05-29 NOTE — PROGRESS NOTES
Subjective:    The patient is awake and alert, family at the bedside. No problems overnight.  Denies chest pain, angina, dyspnea or abdominal discomfort. No nausea or vomiting. Tolerating diet.     Objective:    /69   Pulse 100   Temp 97.4 °F (36.3 °C) (Oral)   Resp 16   Ht 1.676 m (5' 6\")   Wt 54.4 kg (120 lb)   SpO2 99%   BMI 19.37 kg/m²     General: Alert and oriented, no acute distress  HEENT: No thrush or mucositis, EOMI, PERRLA  Heart:  RRR, no murmurs, gallops, or rubs.  Lungs:  CTA bilaterally, no wheeze, rales or rhonchi  Abd: BS present, nontender, nondistended, no masses  Extrem:  No clubbing, cyanosis, or edema  Lymphatics: No palpable adenopathy in cervical and supraclavicular regions  Skin: Intact, no petechia or purpura    CBC with Differential:    Lab Results   Component Value Date/Time    WBC 8.8 05/29/2024 04:02 AM    RBC 3.23 05/29/2024 04:02 AM    HGB 9.0 05/29/2024 05:58 AM    HCT 28.1 05/29/2024 05:58 AM     05/29/2024 04:02 AM    MCV 88.2 05/29/2024 04:02 AM    MCH 28.5 05/29/2024 04:02 AM    MCHC 32.3 05/29/2024 04:02 AM    RDW 17.4 05/29/2024 04:02 AM    LYMPHOPCT 11 05/29/2024 04:02 AM    MONOPCT 11 05/29/2024 04:02 AM    EOSPCT 2 05/29/2024 04:02 AM    BASOPCT 0 05/29/2024 04:02 AM    MONOSABS 1.01 05/29/2024 04:02 AM    EOSABS 0.15 05/29/2024 04:02 AM    BASOSABS 0.03 05/29/2024 04:02 AM     CMP:    Lab Results   Component Value Date/Time     05/29/2024 04:02 AM    K 4.2 05/29/2024 04:02 AM     05/29/2024 04:02 AM    CO2 22 05/29/2024 04:02 AM    BUN 16 05/29/2024 04:02 AM    CREATININE 1.0 05/29/2024 04:02 AM    GFRAA >60 06/26/2016 10:55 AM    LABGLOM 77 05/29/2024 04:02 AM    GLUCOSE 86 05/29/2024 04:02 AM    CALCIUM 9.5 05/29/2024 04:02 AM    BILITOT 1.0 05/29/2024 04:02 AM    ALKPHOS 173 05/29/2024 04:02 AM    AST 19 05/29/2024 04:02 AM    ALT 10 05/29/2024 04:02 AM              Current Facility-Administered Medications:     0.9 % sodium chloride  SubCUTAneous, Nightly, Matthew Casey APRN - CNP    insulin lispro protamine & lispro (HumaLOG MIX) (75-25) 100 UNIT per ML injection vial 4 Units (Patient Supplied), 4 Units, SubCUTAneous, BID WC, Matthew Casey APRN - CNP    doxazosin (CARDURA) tablet 2 mg, 2 mg, Oral, Daily, Matthew Casey APRN - CNP    allopurinol (ZYLOPRIM) tablet 100 mg, 100 mg, Oral, Daily, Matthew Casey APRN - CNP    atorvastatin (LIPITOR) tablet 10 mg, 10 mg, Oral, Daily, Matthew Casey APRN - CNP    meloxicam (MOBIC) tablet 7.5 mg, 7.5 mg, Oral, Daily, Matthew Casey APRN - CNP    metoprolol tartrate (LOPRESSOR) tablet 25 mg, 25 mg, Oral, BID, Matthew Casey APRN - CNP    losartan (COZAAR) tablet 50 mg, 50 mg, Oral, Daily **AND** hydroCHLOROthiazide tablet 12.5 mg, 12.5 mg, Oral, Daily, Matthew Casey APRN - CNP    0.9 % sodium chloride infusion, , IntraVENous, PRN, Michael Muir, DO    CT ABDOMEN PELVIS W IV CONTRAST Additional Contrast? None   Final Result   1. 9.0 cm right renal mass almost certainly representing renal cell carcinoma   with large tumor thrombus extending into the inferior vena cava.   2. Cholelithiasis.   3. Diverticulosis.   4. Small ascites.   5. Small bilateral pleural effusions with chronic interstitial lung disease.   6. Atherosclerosis.   7. Prominent aortocaval lymph nodes up to 9 mm short axis. Cannot exclude   metastatic involvement.      RECOMMENDATIONS:   Urological surgery and oncology consultations recommended.         CTA PULMONARY W CONTRAST   Final Result   1. No acute pulmonary artery embolism.   2. Extensive mediastinal and bilateral supraclavicular lymphadenopathy   concerning for metastatic disease.   3. Small bilateral pleural effusions with compressive atelectasis.         CT Head W/O Contrast   Final Result   1. No acute intracranial abnormality.   2. Moderate cerebral atrophy with periventricular white matter changes.   3. Chronic left basal ganglia lacunar infarct.         IR Interventional Radiology

## 2024-05-30 ENCOUNTER — APPOINTMENT (OUTPATIENT)
Dept: INTERVENTIONAL RADIOLOGY/VASCULAR | Age: 79
DRG: 686 | End: 2024-05-30
Payer: MEDICARE

## 2024-05-30 PROBLEM — E43 SEVERE PROTEIN-CALORIE MALNUTRITION (HCC): Chronic | Status: ACTIVE | Noted: 2024-05-30

## 2024-05-30 LAB
ABO/RH: NORMAL
ALBUMIN SERPL-MCNC: 2.4 G/DL (ref 3.5–5.2)
ALP SERPL-CCNC: 175 U/L (ref 40–129)
ALT SERPL-CCNC: 9 U/L (ref 0–40)
ANION GAP SERPL CALCULATED.3IONS-SCNC: 11 MMOL/L (ref 7–16)
ANTIBODY SCREEN: NEGATIVE
ARM BAND NUMBER: NORMAL
AST SERPL-CCNC: 16 U/L (ref 0–39)
BASOPHILS # BLD: 0.02 K/UL (ref 0–0.2)
BASOPHILS NFR BLD: 0 % (ref 0–2)
BILIRUB SERPL-MCNC: 0.7 MG/DL (ref 0–1.2)
BLOOD BANK BLOOD PRODUCT EXPIRATION DATE: NORMAL
BLOOD BANK DISPENSE STATUS: NORMAL
BLOOD BANK ISBT PRODUCT BLOOD TYPE: 6200
BLOOD BANK PRODUCT CODE: NORMAL
BLOOD BANK SAMPLE EXPIRATION: NORMAL
BLOOD BANK UNIT TYPE AND RH: NORMAL
BPU ID: NORMAL
BUN SERPL-MCNC: 16 MG/DL (ref 6–23)
CALCIUM SERPL-MCNC: 9.8 MG/DL (ref 8.6–10.2)
CHLORIDE SERPL-SCNC: 101 MMOL/L (ref 98–107)
CO2 SERPL-SCNC: 23 MMOL/L (ref 22–29)
COMPONENT: NORMAL
CREAT SERPL-MCNC: 1.1 MG/DL (ref 0.7–1.2)
CROSSMATCH RESULT: NORMAL
EOSINOPHIL # BLD: 0.23 K/UL (ref 0.05–0.5)
EOSINOPHILS RELATIVE PERCENT: 3 % (ref 0–6)
ERYTHROCYTE [DISTWIDTH] IN BLOOD BY AUTOMATED COUNT: 17.9 % (ref 11.5–15)
GFR, ESTIMATED: 72 ML/MIN/1.73M2
GLUCOSE BLD-MCNC: 109 MG/DL (ref 74–99)
GLUCOSE BLD-MCNC: 141 MG/DL (ref 74–99)
GLUCOSE BLD-MCNC: 144 MG/DL (ref 74–99)
GLUCOSE SERPL-MCNC: 106 MG/DL (ref 74–99)
HCT VFR BLD AUTO: 29.9 % (ref 37–54)
HCT VFR BLD AUTO: 30.4 % (ref 37–54)
HCT VFR BLD AUTO: 30.7 % (ref 37–54)
HGB BLD-MCNC: 9.4 G/DL (ref 12.5–16.5)
HGB BLD-MCNC: 9.5 G/DL (ref 12.5–16.5)
HGB BLD-MCNC: 9.6 G/DL (ref 12.5–16.5)
IMM GRANULOCYTES # BLD AUTO: 0.04 K/UL (ref 0–0.58)
IMM GRANULOCYTES NFR BLD: 1 % (ref 0–5)
LYMPHOCYTES NFR BLD: 0.85 K/UL (ref 1.5–4)
LYMPHOCYTES RELATIVE PERCENT: 10 % (ref 20–42)
MCH RBC QN AUTO: 28 PG (ref 26–35)
MCHC RBC AUTO-ENTMCNC: 31.6 G/DL (ref 32–34.5)
MCV RBC AUTO: 88.6 FL (ref 80–99.9)
MONOCYTES NFR BLD: 1 K/UL (ref 0.1–0.95)
MONOCYTES NFR BLD: 11 % (ref 2–12)
NEUTROPHILS NFR BLD: 76 % (ref 43–80)
NEUTS SEG NFR BLD: 6.69 K/UL (ref 1.8–7.3)
NON-GYN CYTOLOGY REPORT: NORMAL
PLATELET # BLD AUTO: 209 K/UL (ref 130–450)
PMV BLD AUTO: 9.3 FL (ref 7–12)
POTASSIUM SERPL-SCNC: 4.2 MMOL/L (ref 3.5–5)
PROT SERPL-MCNC: 4.9 G/DL (ref 6.4–8.3)
RBC # BLD AUTO: 3.43 M/UL (ref 3.8–5.8)
SODIUM SERPL-SCNC: 135 MMOL/L (ref 132–146)
TRANSFUSION STATUS: NORMAL
UNIT DIVISION: 0
UNIT ISSUE DATE/TIME: NORMAL
WBC OTHER # BLD: 8.8 K/UL (ref 4.5–11.5)

## 2024-05-30 PROCEDURE — 6360000002 HC RX W HCPCS

## 2024-05-30 PROCEDURE — 88344 IMHCHEM/IMCYTCHM EA MLT ANTB: CPT

## 2024-05-30 PROCEDURE — 7100000010 HC PHASE II RECOVERY - FIRST 15 MIN: Performed by: RADIOLOGY

## 2024-05-30 PROCEDURE — 6370000000 HC RX 637 (ALT 250 FOR IP): Performed by: INTERNAL MEDICINE

## 2024-05-30 PROCEDURE — 88341 IMHCHEM/IMCYTCHM EA ADD ANTB: CPT

## 2024-05-30 PROCEDURE — 88305 TISSUE EXAM BY PATHOLOGIST: CPT

## 2024-05-30 PROCEDURE — 2709999900 IR BIOPSY KIDNEY PERCUTANEOUS

## 2024-05-30 PROCEDURE — 36415 COLL VENOUS BLD VENIPUNCTURE: CPT

## 2024-05-30 PROCEDURE — 85025 COMPLETE CBC W/AUTO DIFF WBC: CPT

## 2024-05-30 PROCEDURE — 82962 GLUCOSE BLOOD TEST: CPT

## 2024-05-30 PROCEDURE — 88342 IMHCHEM/IMCYTCHM 1ST ANTB: CPT

## 2024-05-30 PROCEDURE — 80053 COMPREHEN METABOLIC PANEL: CPT

## 2024-05-30 PROCEDURE — 6360000002 HC RX W HCPCS: Performed by: INTERNAL MEDICINE

## 2024-05-30 PROCEDURE — C9113 INJ PANTOPRAZOLE SODIUM, VIA: HCPCS | Performed by: INTERNAL MEDICINE

## 2024-05-30 PROCEDURE — 85018 HEMOGLOBIN: CPT

## 2024-05-30 PROCEDURE — 2060000000 HC ICU INTERMEDIATE R&B

## 2024-05-30 PROCEDURE — 50200 RENAL BIOPSY PERQ: CPT

## 2024-05-30 PROCEDURE — 6360000002 HC RX W HCPCS: Performed by: RADIOLOGY

## 2024-05-30 PROCEDURE — 85014 HEMATOCRIT: CPT

## 2024-05-30 PROCEDURE — 2580000003 HC RX 258: Performed by: INTERNAL MEDICINE

## 2024-05-30 PROCEDURE — 6370000000 HC RX 637 (ALT 250 FOR IP)

## 2024-05-30 PROCEDURE — 77012 CT SCAN FOR NEEDLE BIOPSY: CPT

## 2024-05-30 PROCEDURE — 7100000011 HC PHASE II RECOVERY - ADDTL 15 MIN: Performed by: RADIOLOGY

## 2024-05-30 RX ORDER — FENTANYL CITRATE 0.05 MG/ML
INJECTION, SOLUTION INTRAMUSCULAR; INTRAVENOUS PRN
Status: COMPLETED | OUTPATIENT
Start: 2024-05-30 | End: 2024-05-30

## 2024-05-30 RX ORDER — MIDAZOLAM HYDROCHLORIDE 1 MG/ML
INJECTION INTRAMUSCULAR; INTRAVENOUS PRN
Status: COMPLETED | OUTPATIENT
Start: 2024-05-30 | End: 2024-05-30

## 2024-05-30 RX ADMIN — LOSARTAN POTASSIUM 50 MG: 50 TABLET, FILM COATED ORAL at 08:27

## 2024-05-30 RX ADMIN — METOPROLOL TARTRATE 25 MG: 25 TABLET, FILM COATED ORAL at 08:27

## 2024-05-30 RX ADMIN — HYDROCHLOROTHIAZIDE 12.5 MG: 12.5 TABLET ORAL at 08:25

## 2024-05-30 RX ADMIN — METOPROLOL TARTRATE 25 MG: 25 TABLET, FILM COATED ORAL at 21:25

## 2024-05-30 RX ADMIN — COLCHICINE 0.6 MG: 0.6 TABLET, FILM COATED ORAL at 08:25

## 2024-05-30 RX ADMIN — ATORVASTATIN CALCIUM 10 MG: 10 TABLET, FILM COATED ORAL at 08:26

## 2024-05-30 RX ADMIN — ALLOPURINOL 100 MG: 100 TABLET ORAL at 08:27

## 2024-05-30 RX ADMIN — PANTOPRAZOLE SODIUM 40 MG: 40 INJECTION, POWDER, FOR SOLUTION INTRAVENOUS at 08:27

## 2024-05-30 RX ADMIN — SODIUM CHLORIDE, PRESERVATIVE FREE 10 ML: 5 INJECTION INTRAVENOUS at 21:31

## 2024-05-30 RX ADMIN — PANTOPRAZOLE SODIUM 40 MG: 40 INJECTION, POWDER, FOR SOLUTION INTRAVENOUS at 21:24

## 2024-05-30 RX ADMIN — MELOXICAM 7.5 MG: 7.5 TABLET ORAL at 08:27

## 2024-05-30 RX ADMIN — SODIUM CHLORIDE, PRESERVATIVE FREE 10 ML: 5 INJECTION INTRAVENOUS at 08:28

## 2024-05-30 RX ADMIN — INSULIN LISPRO 4 UNITS: 100 INJECTION, SUSPENSION SUBCUTANEOUS at 17:08

## 2024-05-30 RX ADMIN — DOXAZOSIN 2 MG: 1 TABLET ORAL at 08:26

## 2024-05-30 RX ADMIN — INSULIN LISPRO 3 UNITS: 100 INJECTION, SUSPENSION SUBCUTANEOUS at 21:23

## 2024-05-30 RX ADMIN — MIDAZOLAM 1 MG: 1 INJECTION INTRAMUSCULAR; INTRAVENOUS at 12:10

## 2024-05-30 RX ADMIN — LEVOFLOXACIN 250 MG: 250 INJECTION, SOLUTION INTRAVENOUS at 08:34

## 2024-05-30 RX ADMIN — FENTANYL CITRATE 50 MCG: 50 INJECTION INTRAMUSCULAR; INTRAVENOUS at 12:10

## 2024-05-30 NOTE — ACP (ADVANCE CARE PLANNING)
Advance Care Planning   Healthcare Decision Maker:    Primary Decision Maker: Re Mayo - [facility] - [phone]    Today we documented Decision Maker(s) consistent with Legal Next of Kin hierarchy.

## 2024-05-30 NOTE — PROGRESS NOTES
Patient came down to special procedures for ct guided right kidney biopsy with possible moderate sedation.  Patient was educated about the amount of radiation used with today's procedure.    Patient taken to Cat Scan.  Patient positioned prone, secured on Cat Scan table with O2 and monitoring devices attached.     Patient scanned and images reviewed by Dr. Sharma.    Patient prepped secured and draped.     Emotional support given.    1210 - sedation medication given.    1212 - Procedure start /77 81 15 100% on 2LPM/nasal cannula.    With the guidance of Cat Scan, needle inserted and 4 - 18 gauge core biopsies taken by Dr. Sharma.     Patient re-scanned and images reviewed by Dr. Sharma.    Puncture site cleansed and dry sterile dressing of folded 4 x 4 and tegaderm applied to right lower back.     1224 - Procedure completed /74 80 14 100% on 2LPM/nasal cannula.    Patient tolerated procedure well.    Biopsy sample taken to laboratory.     See sedation documentation for vital signs.    1239 - 15 minutes post procedure /73 73 15 96% on room air.  No complaints of pain at puncture site. Dressing CDI.    1254 - 30 minutes post procedure /69 72 14 98% on room air.  No complaints of pain at puncture site. Dressing CDI.    Total amount of sedation medication given during procedure: 1 mg of IV Versed and 50 mcg of IV Fentanyl    Nurse to nurse called, spoke with Mounika RN, notified nurse of above information, nurse assumed post sedation vitals signs.    Patient transported back to 6th floor.

## 2024-05-30 NOTE — CARE COORDINATION
Case Management Assessment  Initial Evaluation    Date/Time of Evaluation: 5/30/2024 10:22 AM  Assessment Completed by: Pauline Brooks RN    If patient is discharged prior to next notation, then this note serves as note for discharge by case management.    Patient Name: Morris Mayo                   YOB: 1945  Diagnosis: Renal mass [N28.89]  Symptomatic anemia [D64.9]                   Date / Time: 5/28/2024  9:02 PM    Patient Admission Status: Inpatient   Readmission Risk (Low < 19, Mod (19-27), High > 27): Readmission Risk Score: 13.2    Current PCP: Emiliano Schroeder, DO  PCP verified by CM? Yes    Chart Reviewed: Yes      History Provided by: Patient  Patient Orientation: Alert and Oriented    Patient Cognition: Alert    Hospitalization in the last 30 days (Readmission):  No    If yes, Readmission Assessment in CM Navigator will be completed.    Advance Directives:      Code Status: Full Code   Patient's Primary Decision Maker is: Legal Next of Kin    Primary Decision Maker: Re Mayo - Spouse - [phone]    Discharge Planning:    Patient lives with: Spouse/Significant Other Type of Home: House  Primary Care Giver: Self  Patient Support Systems include: Spouse/Significant Other, Children, Family Members   Current Financial resources:      ADLS  Prior functional level: Assistance with the following:, Mobility  Current functional level: Assistance with the following:, Mobility      Family can provide assistance at DC: Yes  Would you like Case Management to discuss the discharge plan with any other family members/significant others, and if so, who? No  Plans to Return to Present Housing: Yes  Other Identified Issues/Barriers to RETURNING to current housing: none    Financial    Payor: MEDICARE / Plan: MEDICARE PART A AND B / Product Type: *No Product type* /       [id number] - [place], [place] - [address] [facility] -  [phone] - F [phone]  [address] [facility]

## 2024-05-30 NOTE — PROGRESS NOTES
Comprehensive Nutrition Assessment    Type and Reason for Visit:  Initial, Positive Nutrition Screen (MST 5)    Nutrition Recommendations/Plan:   Continue NPO  Begin Ensure Clear TID once diet is advanced     Malnutrition Assessment:  Malnutrition Status:  Severe malnutrition (05/30/24 1114)    Context:  Chronic Illness     Findings of the 6 clinical characteristics of malnutrition:  Energy Intake:  75% or less estimated energy requirements for 1 month or longer  Weight Loss:  Unable to assess (last weight in emr 2017)     Body Fat Loss:  Severe body fat loss Orbital, Triceps, Fat Overlying Ribs   Muscle Mass Loss:  Severe muscle mass loss Temples (temporalis), Clavicles (pectoralis & deltoids), Calf (gastrocnemius)  Fluid Accumulation:  No significant fluid accumulation     Strength:  Not Performed    Nutrition Assessment:    Pt admit for anemia, ascites, bilateral pleural effusions, Cholelithiasis, Diverticulosis. CT revealed right renal mass representing renal cell carcinoma w/ tumor extending into inferior vena cava. PMHx: CABG, Asthma, DM, GOUT, HLD/HTN. Pt meets criteria for severe malnutrition. Pt reports a 30lb weight loss since December 2023, poor po intake w/ n/v prior to admission. Pt declined high chan/high protein ONS and requested Ensure Clear, will provide TID, continue inpatient monitoring, f/up per policy.    Nutrition Related Findings:    A/O x4, I/O -300 since admit, abd wdl, bowel sounds active x4, e'lytes wnl, ALK PHOS 175, Hgb 9.6, Hct 30.4, Iron 38, TIBC 88 Wound Type: None       Current Nutrition Intake & Therapies:    Average Meal Intake: NPO  Average Supplements Intake: None Ordered  Diet NPO Exceptions are: Sips of Water with Meds    Anthropometric Measures:  Height: 167.6 cm (5' 5.98\")  Ideal Body Weight (IBW): 142 lbs (65 kg)    Admission Body Weight: 54.4 kg (119 lb 14.9 oz)  Current Body Weight: 54.4 kg (119 lb 14.9 oz), 84.5 % IBW.    Current BMI (kg/m2): 19.4  Usual Body Weight:

## 2024-05-30 NOTE — PROGRESS NOTES
5/30/2024 9:43 AM  Morris WEISS [facility]  [id number]    Subjective:  awake and alert  No family present  Awaiting IR biopsy     Review of Systems  Constitutional: No fever or chills   Respiratory: negative for cough and hemoptysis  Cardiovascular: negative for chest pain and dyspnea  Gastrointestinal: negative for abdominal pain, diarrhea, nausea and vomiting   : See above  Derm: negative for rash and skin lesion(s)  Neurological: negative for seizures and tremors  Musculoskeletal: Negative    Psychiatric: Negative   All other reviews are negative      Scheduled Meds:   sodium chloride flush  5-40 mL IntraVENous 2 times per day    colchicine  0.6 mg Oral Daily    pantoprazole  40 mg IntraVENous BID    levofloxacin  250 mg IntraVENous Q24H    doxazosin  2 mg Oral Daily    allopurinol  100 mg Oral Daily    atorvastatin  10 mg Oral Daily    meloxicam  7.5 mg Oral Daily    metoprolol tartrate  25 mg Oral BID    losartan  50 mg Oral Daily    And    hydroCHLOROthiazide  12.5 mg Oral Daily    insulin lispro protamine & lispro  3 Units SubCUTAneous Nightly    insulin lispro protamine & lispro  4 Units SubCUTAneous BID WC    enoxaparin  30 mg SubCUTAneous BID       Objective:  Vitals:    05/30/24 0825   BP: (!) 171/77   Pulse: 81   Resp:    Temp:    SpO2:          Allergies: Terramycin [oxytetracycline] and Pcn [penicillins]    General Appearance: alert and oriented to person, place and time and in no acute distress  Skin: no rash or erythema  Head: normocephalic and atraumatic  Pulmonary/Chest: normal air movement, no respiratory distress  Abdomen: soft, non-tender, non-distended  Genitourinary: no rodrigez   Extremities: no cyanosis, clubbing or edema         Labs:     Recent Labs     05/30/24  0525      K 4.2      CO2 23   BUN 16   CREATININE 1.1   GLUCOSE 106*   CALCIUM 9.8       Lab Results   Component Value Date/Time    HGB 9.6 05/30/2024 05:25 AM    HCT 30.4 05/30/2024 05:25 AM       Lab Results   Component

## 2024-05-30 NOTE — PROGRESS NOTES
Subjective:    The patient is awake and alert.  No problems overnight.  Denies chest pain, angina, dyspnea or abdominal discomfort. No nausea or vomiting. Tolerating diet.  Stated his biopsy went well today and he is hoping to be discharged to home tomorrow. He did express his want for PT to come work with him in his home again.     Objective:    BP (!) 167/62   Pulse 70   Temp 97.7 °F (36.5 °C) (Oral)   Resp 23   Ht 1.676 m (5' 5.98\")   Wt 54.4 kg (120 lb)   SpO2 100%   BMI 19.38 kg/m²     General: Alert and oriented, no acute distress  HEENT: No thrush or mucositis, EOMI, PERRLA  Heart:  RRR, no murmurs, gallops, or rubs.  Lungs:  CTA bilaterally, no wheeze, rales or rhonchi  Abd: BS present, nontender, nondistended, no masses  Extrem:  No clubbing, cyanosis, or edema  Lymphatics: No palpable adenopathy in cervical and supraclavicular regions  Skin: Intact, no petechia or purpura    CBC with Differential:    Lab Results   Component Value Date/Time    WBC 8.8 05/30/2024 05:25 AM    RBC 3.43 05/30/2024 05:25 AM    HGB 9.6 05/30/2024 05:25 AM    HCT 30.4 05/30/2024 05:25 AM     05/30/2024 05:25 AM    MCV 88.6 05/30/2024 05:25 AM    MCH 28.0 05/30/2024 05:25 AM    MCHC 31.6 05/30/2024 05:25 AM    RDW 17.9 05/30/2024 05:25 AM    LYMPHOPCT 10 05/30/2024 05:25 AM    MONOPCT 11 05/30/2024 05:25 AM    EOSPCT 3 05/30/2024 05:25 AM    BASOPCT 0 05/30/2024 05:25 AM    MONOSABS 1.00 05/30/2024 05:25 AM    EOSABS 0.23 05/30/2024 05:25 AM    BASOSABS 0.02 05/30/2024 05:25 AM     CMP:    Lab Results   Component Value Date/Time     05/30/2024 05:25 AM    K 4.2 05/30/2024 05:25 AM     05/30/2024 05:25 AM    CO2 23 05/30/2024 05:25 AM    BUN 16 05/30/2024 05:25 AM    CREATININE 1.1 05/30/2024 05:25 AM    GFRAA >60 06/26/2016 10:55 AM    LABGLOM 72 05/30/2024 05:25 AM    GLUCOSE 106 05/30/2024 05:25 AM    CALCIUM 9.8 05/30/2024 05:25 AM    BILITOT 0.7 05/30/2024 05:25 AM    ALKPHOS 175 05/30/2024 05:25 AM  axis. Cannot exclude   metastatic involvement.      RECOMMENDATIONS:   Urological surgery and oncology consultations recommended.         CTA PULMONARY W CONTRAST   Final Result   1. No acute pulmonary artery embolism.   2. Extensive mediastinal and bilateral supraclavicular lymphadenopathy   concerning for metastatic disease.   3. Small bilateral pleural effusions with compressive atelectasis.         CT Head W/O Contrast   Final Result   1. No acute intracranial abnormality.   2. Moderate cerebral atrophy with periventricular white matter changes.   3. Chronic left basal ganglia lacunar infarct.             Assessment:    Principal Problem:    Symptomatic anemia  Active Problems:    Severe protein-calorie malnutrition (HCC)  Resolved Problems:    * No resolved hospital problems. *    78 year old male known to Dr. Hooks with anemia. He recently had a bone marrow biopsy and aspiration completed and was unremarkable. Follows with Dr. Lopez re: gastroparesis. Admits to a 30 lb weight loss since Dec.      He presents to the ER with complaints of lightheadedness on and off for the last 4 weeks. This is accompanied by nausea and vomiting so his oral intake has been reduced. CTA pulm is negative for PE, shows extensive mediastinal and bilateral supraclavicular lymphadenopathy concerning for metastatic disease, and small bilateral pleural effusions with compressive atelectasis. CT abd/pelvis shows 9.0 cm right renal mass almost certainly representing renal cell carcinoma with large tumor thrombus extending into the inferior vena cava.    Plan:    - CBC daily. Maintain Hgb above 7.0, transfuse when needed. Hgb 9.6  - Urology following re: high suspicion of widely disseminated metastatic renal cell carcinoma. CT guided biopsy completed today.  - Pt previously had PT at home and is wanting them to come and work with him again so he can increase his strength.   - PSA 0.60  - CEA 3.0  - Continue supportive care  - Plan to  follow up with Dr. Hooks in the clinic to review biopsy results.     Collaboration of care with Dr. Gaxiola      Electronically signed by HANSA Winkler CNP on 5/30/2024 at 2:25 PM

## 2024-05-30 NOTE — PROGRESS NOTES
Internal Medicine Consult Note    [facility]    Paul De La O D.O., CHACHAI.                    Charanjit Romero D.O., ARGELIA Haro D.O.     Cara Gannon, MSN, APRN, NP-C  Abram Rodriguez, MSN, APRN-CNP  Matthew Casey, MSN, APRN-CNP  Rafaela Joseph, MSN APRN-CNP  Rhonda Martinez, MSN. APRN-NP-C     Primary Care Physician: Emiliano Schroeder DO   Admitting Physician:  Paul De La O DO  Admission date and time: 5/28/2024  9:02 PM    Room:  [address] [place] [address]  Admitting diagnosis: Renal mass [N28.89]  Symptomatic anemia [D64.9]    Patient Name: Morris Mayo  MRN: [id number]    Date of Service: 5/30/2024     Subjective:  Morris is a 78 y.o. male who was seen and examined today,5/30/2024, at the bedside.  Patient had just returned from his kidney biopsy.  He is resting comfortably on room air and denies any pain or discomfort his son is at the bedside.  He states his appetite is little better since starting on antibiotics.  Patient encouraged to take oral supplements with his meals.  Family present during my examination.    Review of System:   Constitutional:   Denies fever or chills, weight loss or gain, improving fatigue and general malaise HEENT:   Denies ear pain, sore throat, sinus or eye problems.  Cardiovascular:   Denies any chest pain, irregular heartbeats, or palpitations.   Respiratory:   Denies shortness of breath, coughing, sputum production, hemoptysis, or wheezing.  Gastrointestinal:   Denies nausea, vomiting, diarrhea, or constipation.  Denies any abdominal pain.  Reports decreased appetite and weight loss  Genitourinary:    Denies any urgency, frequency, hematuria. Voiding  without difficulty.  Extremities:   Denies lower extremity swelling, edema or cyanosis.   Neurology:    Denies any headache or focal neurological deficits, Denies generalized weakness or memory difficulty.   Psch:   Denies being anxious or

## 2024-05-31 PROBLEM — N28.89 RENAL MASS: Status: ACTIVE | Noted: 2024-05-31

## 2024-05-31 LAB
ALBUMIN SERPL-MCNC: 2.3 G/DL (ref 3.5–5.2)
ALP SERPL-CCNC: 173 U/L (ref 40–129)
ALT SERPL-CCNC: 9 U/L (ref 0–40)
ANION GAP SERPL CALCULATED.3IONS-SCNC: 11 MMOL/L (ref 7–16)
AST SERPL-CCNC: 16 U/L (ref 0–39)
BASOPHILS # BLD: 0.02 K/UL (ref 0–0.2)
BASOPHILS NFR BLD: 0 % (ref 0–2)
BILIRUB SERPL-MCNC: 0.6 MG/DL (ref 0–1.2)
BUN SERPL-MCNC: 18 MG/DL (ref 6–23)
CALCIUM SERPL-MCNC: 10.1 MG/DL (ref 8.6–10.2)
CHLORIDE SERPL-SCNC: 99 MMOL/L (ref 98–107)
CO2 SERPL-SCNC: 24 MMOL/L (ref 22–29)
CREAT SERPL-MCNC: 1.1 MG/DL (ref 0.7–1.2)
EOSINOPHIL # BLD: 0.21 K/UL (ref 0.05–0.5)
EOSINOPHILS RELATIVE PERCENT: 2 % (ref 0–6)
ERYTHROCYTE [DISTWIDTH] IN BLOOD BY AUTOMATED COUNT: 17.8 % (ref 11.5–15)
GFR, ESTIMATED: 73 ML/MIN/1.73M2
GLUCOSE BLD-MCNC: 107 MG/DL (ref 74–99)
GLUCOSE BLD-MCNC: 86 MG/DL (ref 74–99)
GLUCOSE BLD-MCNC: 98 MG/DL (ref 74–99)
GLUCOSE SERPL-MCNC: 128 MG/DL (ref 74–99)
HCT VFR BLD AUTO: 30.4 % (ref 37–54)
HCT VFR BLD AUTO: 31 % (ref 37–54)
HCT VFR BLD AUTO: 31.5 % (ref 37–54)
HGB BLD-MCNC: 9.5 G/DL (ref 12.5–16.5)
HGB BLD-MCNC: 9.7 G/DL (ref 12.5–16.5)
HGB BLD-MCNC: 9.7 G/DL (ref 12.5–16.5)
IMM GRANULOCYTES # BLD AUTO: 0.07 K/UL (ref 0–0.58)
IMM GRANULOCYTES NFR BLD: 1 % (ref 0–5)
LYMPHOCYTES NFR BLD: 1.26 K/UL (ref 1.5–4)
LYMPHOCYTES RELATIVE PERCENT: 13 % (ref 20–42)
MCH RBC QN AUTO: 28 PG (ref 26–35)
MCHC RBC AUTO-ENTMCNC: 31.3 G/DL (ref 32–34.5)
MCV RBC AUTO: 89.3 FL (ref 80–99.9)
MICROORGANISM SPEC CULT: ABNORMAL
MONOCYTES NFR BLD: 1.24 K/UL (ref 0.1–0.95)
MONOCYTES NFR BLD: 13 % (ref 2–12)
NEUTROPHILS NFR BLD: 72 % (ref 43–80)
NEUTS SEG NFR BLD: 7.13 K/UL (ref 1.8–7.3)
PLATELET # BLD AUTO: 200 K/UL (ref 130–450)
PMV BLD AUTO: 9.9 FL (ref 7–12)
POTASSIUM SERPL-SCNC: 4.8 MMOL/L (ref 3.5–5)
PROT SERPL-MCNC: 4.9 G/DL (ref 6.4–8.3)
RBC # BLD AUTO: 3.47 M/UL (ref 3.8–5.8)
SODIUM SERPL-SCNC: 134 MMOL/L (ref 132–146)
SPECIMEN DESCRIPTION: ABNORMAL
WBC OTHER # BLD: 9.9 K/UL (ref 4.5–11.5)

## 2024-05-31 PROCEDURE — 85014 HEMATOCRIT: CPT

## 2024-05-31 PROCEDURE — 85018 HEMOGLOBIN: CPT

## 2024-05-31 PROCEDURE — 6360000002 HC RX W HCPCS: Performed by: INTERNAL MEDICINE

## 2024-05-31 PROCEDURE — 6370000000 HC RX 637 (ALT 250 FOR IP): Performed by: INTERNAL MEDICINE

## 2024-05-31 PROCEDURE — 6370000000 HC RX 637 (ALT 250 FOR IP)

## 2024-05-31 PROCEDURE — 80053 COMPREHEN METABOLIC PANEL: CPT

## 2024-05-31 PROCEDURE — 85025 COMPLETE CBC W/AUTO DIFF WBC: CPT

## 2024-05-31 PROCEDURE — C9113 INJ PANTOPRAZOLE SODIUM, VIA: HCPCS | Performed by: INTERNAL MEDICINE

## 2024-05-31 PROCEDURE — 2060000000 HC ICU INTERMEDIATE R&B

## 2024-05-31 PROCEDURE — 2580000003 HC RX 258: Performed by: INTERNAL MEDICINE

## 2024-05-31 PROCEDURE — 36415 COLL VENOUS BLD VENIPUNCTURE: CPT

## 2024-05-31 PROCEDURE — 6360000002 HC RX W HCPCS

## 2024-05-31 PROCEDURE — 82962 GLUCOSE BLOOD TEST: CPT

## 2024-05-31 RX ORDER — METOCLOPRAMIDE HYDROCHLORIDE 5 MG/5ML
5 SOLUTION ORAL
Status: DISCONTINUED | OUTPATIENT
Start: 2024-05-31 | End: 2024-06-01 | Stop reason: HOSPADM

## 2024-05-31 RX ADMIN — INSULIN LISPRO 3 UNITS: 100 INJECTION, SUSPENSION SUBCUTANEOUS at 21:06

## 2024-05-31 RX ADMIN — PANTOPRAZOLE SODIUM 40 MG: 40 INJECTION, POWDER, FOR SOLUTION INTRAVENOUS at 08:14

## 2024-05-31 RX ADMIN — PANTOPRAZOLE SODIUM 40 MG: 40 INJECTION, POWDER, FOR SOLUTION INTRAVENOUS at 21:00

## 2024-05-31 RX ADMIN — METOPROLOL TARTRATE 25 MG: 25 TABLET, FILM COATED ORAL at 21:00

## 2024-05-31 RX ADMIN — LOSARTAN POTASSIUM 50 MG: 50 TABLET, FILM COATED ORAL at 08:14

## 2024-05-31 RX ADMIN — SODIUM CHLORIDE, PRESERVATIVE FREE 10 ML: 5 INJECTION INTRAVENOUS at 21:01

## 2024-05-31 RX ADMIN — LEVOFLOXACIN 250 MG: 250 INJECTION, SOLUTION INTRAVENOUS at 08:23

## 2024-05-31 RX ADMIN — ALLOPURINOL 100 MG: 100 TABLET ORAL at 08:14

## 2024-05-31 RX ADMIN — ENOXAPARIN SODIUM 30 MG: 100 INJECTION SUBCUTANEOUS at 21:00

## 2024-05-31 RX ADMIN — INSULIN LISPRO 4 UNITS: 100 INJECTION, SUSPENSION SUBCUTANEOUS at 18:52

## 2024-05-31 RX ADMIN — ENOXAPARIN SODIUM 30 MG: 100 INJECTION SUBCUTANEOUS at 08:14

## 2024-05-31 RX ADMIN — METOCLOPRAMIDE HYDROCHLORIDE 5 MG: 5 SOLUTION ORAL at 16:05

## 2024-05-31 RX ADMIN — METOPROLOL TARTRATE 25 MG: 25 TABLET, FILM COATED ORAL at 08:13

## 2024-05-31 RX ADMIN — HYDROCHLOROTHIAZIDE 12.5 MG: 12.5 TABLET ORAL at 08:13

## 2024-05-31 RX ADMIN — COLCHICINE 0.6 MG: 0.6 TABLET, FILM COATED ORAL at 08:14

## 2024-05-31 RX ADMIN — DOXAZOSIN 2 MG: 1 TABLET ORAL at 08:14

## 2024-05-31 RX ADMIN — INSULIN LISPRO 4 UNITS: 100 INJECTION, SUSPENSION SUBCUTANEOUS at 08:12

## 2024-05-31 RX ADMIN — MELOXICAM 7.5 MG: 7.5 TABLET ORAL at 08:13

## 2024-05-31 RX ADMIN — ATORVASTATIN CALCIUM 10 MG: 10 TABLET, FILM COATED ORAL at 08:14

## 2024-05-31 RX ADMIN — SODIUM CHLORIDE, PRESERVATIVE FREE 10 ML: 5 INJECTION INTRAVENOUS at 08:25

## 2024-05-31 NOTE — CARE COORDINATION
5/31/2024 1445 CM Note:  Pt plan is to d/c home with his wife as prior.  Pt has needed DME.  Pt would like to go to outpt PT after discharge(will need a script).  No home going needs identified. Pt's wife will transport pt home.  CM will follow.Electronically signed by Alejandra Sanchez RN on 5/31/2024 at 2:58 PM

## 2024-05-31 NOTE — PLAN OF CARE
Problem: Discharge Planning  Goal: Discharge to home or other facility with appropriate resources  Outcome: Progressing  Flowsheets (Taken 5/31/2024 0800)  Discharge to home or other facility with appropriate resources: Identify barriers to discharge with patient and caregiver     Problem: Safety - Adult  Goal: Free from fall injury  Outcome: Progressing     Problem: ABCDS Injury Assessment  Goal: Absence of physical injury  Outcome: Progressing     Problem: Chronic Conditions and Co-morbidities  Goal: Patient's chronic conditions and co-morbidity symptoms are monitored and maintained or improved  Outcome: Progressing  Flowsheets (Taken 5/31/2024 0800)  Care Plan - Patient's Chronic Conditions and Co-Morbidity Symptoms are Monitored and Maintained or Improved: Monitor and assess patient's chronic conditions and comorbid symptoms for stability, deterioration, or improvement     Problem: Nutrition Deficit:  Goal: Optimize nutritional status  Outcome: Progressing

## 2024-05-31 NOTE — PROGRESS NOTES
Internal Medicine Consult Note    [facility]    Paul De La O D.O., CHACHAI.                    Charanjit Romero D.O., CHACHAI.                             Parrish Haro D.O.     Cara Gannon, MSN, APRN, NP-C  Abram Rodriguez, MSN, APRN-CNP  Matthew Casey, MSN, APRN-CNP  Rafaela Joseph, MSN APRN-CNP  Rhonda Martinez, MSN. APRN-NP-C     Primary Care Physician: Emiliano Schroeder DO   Admitting Physician:  Paul De La O DO  Admission date and time: 5/28/2024  9:02 PM    Room:  [address] [place] [address]  Admitting diagnosis: Renal mass [N28.89]  Symptomatic anemia [D64.9]    Patient Name: Morris Mayo  MRN: [id number]    Date of Service: 5/31/2024     Subjective:  Morris is a 78 y.o. male who was seen and examined today,5/31/2024, at the bedside.  Patient is resting comfortably.  He denies any pain or discomfort.  He did have a biopsy done of the mass on his kidney yesterday.  Spoke with urology earlier today they felt be best for the patient to be transferred to [facility] due to the right renal mass and extensive tumor thrombus.  Will place call to the patient access line to initiate transfer to [facility].  I spoke to the patient about being transferred to [facility] and he is in agreement with this.  Family present during my examination.    Review of System:   Constitutional:   Denies fever or chills, weight loss or gain, improving fatigue and general malaise HEENT:   Denies ear pain, sore throat, sinus or eye problems.  Cardiovascular:   Denies any chest pain, irregular heartbeats, or palpitations.   Respiratory:   Denies shortness of breath, coughing, sputum production, hemoptysis, or wheezing.  Gastrointestinal:   Denies nausea, vomiting, diarrhea, or constipation.  Denies any abdominal pain.  Reports decreased appetite and weight loss  Genitourinary:    Denies any urgency, frequency, hematuria. Voiding  without difficulty.  Extremities:   Denies lower extremity

## 2024-05-31 NOTE — PROGRESS NOTES
5/31/2024 11:29 AM  Morris WEISS [facility]  [id number]    Subjective:  he is awake and alert  Denies flank pain   Had right renal biopsy yesterday   We discussed possible [facility] transfer     Review of Systems  Constitutional: No fever or chills   Respiratory: negative for cough and hemoptysis  Cardiovascular: negative for chest pain and dyspnea  Gastrointestinal: negative for abdominal pain, diarrhea, nausea and vomiting   : See above  Derm: negative for rash and skin lesion(s)  Neurological: negative for seizures and tremors  Musculoskeletal: Negative    Psychiatric: Negative   All other reviews are negative      Scheduled Meds:   sodium chloride flush  5-40 mL IntraVENous 2 times per day    colchicine  0.6 mg Oral Daily    pantoprazole  40 mg IntraVENous BID    levofloxacin  250 mg IntraVENous Q24H    doxazosin  2 mg Oral Daily    allopurinol  100 mg Oral Daily    atorvastatin  10 mg Oral Daily    meloxicam  7.5 mg Oral Daily    metoprolol tartrate  25 mg Oral BID    losartan  50 mg Oral Daily    And    hydroCHLOROthiazide  12.5 mg Oral Daily    insulin lispro protamine & lispro  3 Units SubCUTAneous Nightly    insulin lispro protamine & lispro  4 Units SubCUTAneous BID WC    enoxaparin  30 mg SubCUTAneous BID       Objective:  Vitals:    05/31/24 0800   BP: (!) 142/65   Pulse: 83   Resp: 19   Temp: 98.4 °F (36.9 °C)   SpO2: 96%         Allergies: Terramycin [oxytetracycline] and Pcn [penicillins]    General Appearance: alert and oriented to person, place and time and in no acute distress  Skin: no rash or erythema  Head: normocephalic and atraumatic  Pulmonary/Chest: normal air movement, no respiratory distress  Abdomen: soft, non-tender, non-distended  Genitourinary: no rodrigez  Extremities: no cyanosis, clubbing or edema         Labs:     Recent Labs     05/31/24  0457      K 4.8   CL 99   CO2 24   BUN 18   CREATININE 1.1   GLUCOSE 128*   CALCIUM 10.1       Lab Results   Component Value Date/Time    HGB 9.7  05/31/2024 04:57 AM    HCT 31.0 05/31/2024 04:57 AM       Lab Results   Component Value Date    PSA 0.60 05/28/2024    PSA 0.80 08/13/2014    PSA 0.62 11/06/2013         Assessment/Plan:  9cm Right renal mass with large tumor thrombus into the inferior vena cava and suspected metastatic disease   UTI     Had renal mass biopsy yesterday, pathology pending  Oncology following with plans to review biopsy results as an outpatient   Urine culture positive for E. Coli  Antibiotics per primary   CT imaging shows large 9cm right renal mass with extensive tumor thrombus into the inferior vena cava. Discussed with Primary care and patient. Feel it would be best for [facility] transfer to help expedite treatment and care plan. If role for nephrectomy this would be best done at [facility] as well.       Keysha Rose, HANSA - CNP   [facility]  Urology    Agree with above  Seen and examined  Agree with the plan and treatment    Jesús Milan, DO

## 2024-05-31 NOTE — PROGRESS NOTES
chronic interstitial lung disease.   6. Atherosclerosis.   7. Prominent aortocaval lymph nodes up to 9 mm short axis. Cannot exclude   metastatic involvement.      RECOMMENDATIONS:   Urological surgery and oncology consultations recommended.         CTA PULMONARY W CONTRAST   Final Result   1. No acute pulmonary artery embolism.   2. Extensive mediastinal and bilateral supraclavicular lymphadenopathy   concerning for metastatic disease.   3. Small bilateral pleural effusions with compressive atelectasis.         CT Head W/O Contrast   Final Result   1. No acute intracranial abnormality.   2. Moderate cerebral atrophy with periventricular white matter changes.   3. Chronic left basal ganglia lacunar infarct.             Assessment:    Principal Problem:    Symptomatic anemia  Active Problems:    Severe protein-calorie malnutrition (HCC)  Resolved Problems:    * No resolved hospital problems. *    78 year old male known to Dr. Hooks with anemia. He recently had a bone marrow biopsy and aspiration completed and was unremarkable. Follows with Dr. Lopez re: gastroparesis. Admits to a 30 lb weight loss since Dec.      He presents to the ER with complaints of lightheadedness on and off for the last 4 weeks. This is accompanied by nausea and vomiting so his oral intake has been reduced. CTA pulm is negative for PE, shows extensive mediastinal and bilateral supraclavicular lymphadenopathy concerning for metastatic disease, and small bilateral pleural effusions with compressive atelectasis. CT abd/pelvis shows 9.0 cm right renal mass almost certainly representing renal cell carcinoma with large tumor thrombus extending into the inferior vena cava.    Plan:    - CBC daily. Maintain Hgb above 7.0, transfuse when needed. Hgb 9.6  - Urology following re: high suspicion of widely disseminated metastatic renal cell carcinoma. CT guided biopsy completed yesterday.  Due to the size and location of the renal mass and large tumor  thrombus extending into the inferior vena cava, we agree that transfer to the [facility] is the best option for further evaluation and treatment  - PSA 0.60  - CEA 3.0  - Continue supportive care  - Patient is waiting to be transferred to [facility] for further evaluation and treatment    Collaboration of care with Dr. Cornejo      Electronically signed by HANSA Winkler CNP on 5/31/2024 at 1:14 PM    Attending Addendum:    All pertinent labs and imaging reviewed.  Case coordinated and discussed with NP.  Agree with the progress note as above which has been updated to reflect my changes.    Ian Cornejo MD  [facility]

## 2024-05-31 NOTE — SIGNIFICANT EVENT
Spoke with Dr. Castillo at [facility] urology.  Patient's case was reviewed.  She stated there is no reason for a transfer.  This could be taking care of of as an outpatient basis.  She states she would reach out to their oncology department and set up an appointment for the patient.  Patient updated on above events.

## 2024-06-01 VITALS
RESPIRATION RATE: 21 BRPM | HEIGHT: 66 IN | WEIGHT: 120 LBS | OXYGEN SATURATION: 98 % | DIASTOLIC BLOOD PRESSURE: 66 MMHG | SYSTOLIC BLOOD PRESSURE: 152 MMHG | HEART RATE: 83 BPM | BODY MASS INDEX: 19.29 KG/M2 | TEMPERATURE: 98.2 F

## 2024-06-01 LAB
ALBUMIN SERPL-MCNC: 2.3 G/DL (ref 3.5–5.2)
ALP SERPL-CCNC: 171 U/L (ref 40–129)
ALT SERPL-CCNC: 9 U/L (ref 0–40)
ANION GAP SERPL CALCULATED.3IONS-SCNC: 11 MMOL/L (ref 7–16)
AST SERPL-CCNC: 18 U/L (ref 0–39)
BASOPHILS # BLD: 0.02 K/UL (ref 0–0.2)
BASOPHILS NFR BLD: 0 % (ref 0–2)
BILIRUB SERPL-MCNC: 0.6 MG/DL (ref 0–1.2)
BUN SERPL-MCNC: 18 MG/DL (ref 6–23)
CALCIUM SERPL-MCNC: 9.7 MG/DL (ref 8.6–10.2)
CHLORIDE SERPL-SCNC: 98 MMOL/L (ref 98–107)
CO2 SERPL-SCNC: 25 MMOL/L (ref 22–29)
CREAT SERPL-MCNC: 1.1 MG/DL (ref 0.7–1.2)
EOSINOPHIL # BLD: 0.17 K/UL (ref 0.05–0.5)
EOSINOPHILS RELATIVE PERCENT: 2 % (ref 0–6)
ERYTHROCYTE [DISTWIDTH] IN BLOOD BY AUTOMATED COUNT: 18 % (ref 11.5–15)
GFR, ESTIMATED: 71 ML/MIN/1.73M2
GLUCOSE BLD-MCNC: 81 MG/DL (ref 74–99)
GLUCOSE SERPL-MCNC: 73 MG/DL (ref 74–99)
HCT VFR BLD AUTO: 29.6 % (ref 37–54)
HGB BLD-MCNC: 9.2 G/DL (ref 12.5–16.5)
IMM GRANULOCYTES # BLD AUTO: 0.04 K/UL (ref 0–0.58)
IMM GRANULOCYTES NFR BLD: 1 % (ref 0–5)
LYMPHOCYTES NFR BLD: 0.81 K/UL (ref 1.5–4)
LYMPHOCYTES RELATIVE PERCENT: 11 % (ref 20–42)
MCH RBC QN AUTO: 27.8 PG (ref 26–35)
MCHC RBC AUTO-ENTMCNC: 31.1 G/DL (ref 32–34.5)
MCV RBC AUTO: 89.4 FL (ref 80–99.9)
MONOCYTES NFR BLD: 0.94 K/UL (ref 0.1–0.95)
MONOCYTES NFR BLD: 13 % (ref 2–12)
NEUTROPHILS NFR BLD: 73 % (ref 43–80)
NEUTS SEG NFR BLD: 5.35 K/UL (ref 1.8–7.3)
PLATELET # BLD AUTO: 208 K/UL (ref 130–450)
PMV BLD AUTO: 9.6 FL (ref 7–12)
POTASSIUM SERPL-SCNC: 3.8 MMOL/L (ref 3.5–5)
PROT SERPL-MCNC: 4.7 G/DL (ref 6.4–8.3)
RBC # BLD AUTO: 3.31 M/UL (ref 3.8–5.8)
SODIUM SERPL-SCNC: 134 MMOL/L (ref 132–146)
WBC OTHER # BLD: 7.3 K/UL (ref 4.5–11.5)

## 2024-06-01 PROCEDURE — 80053 COMPREHEN METABOLIC PANEL: CPT

## 2024-06-01 PROCEDURE — 36415 COLL VENOUS BLD VENIPUNCTURE: CPT

## 2024-06-01 PROCEDURE — 85025 COMPLETE CBC W/AUTO DIFF WBC: CPT

## 2024-06-01 PROCEDURE — 6370000000 HC RX 637 (ALT 250 FOR IP): Performed by: INTERNAL MEDICINE

## 2024-06-01 PROCEDURE — 6360000002 HC RX W HCPCS

## 2024-06-01 PROCEDURE — 2580000003 HC RX 258: Performed by: INTERNAL MEDICINE

## 2024-06-01 PROCEDURE — C9113 INJ PANTOPRAZOLE SODIUM, VIA: HCPCS | Performed by: INTERNAL MEDICINE

## 2024-06-01 PROCEDURE — 97161 PT EVAL LOW COMPLEX 20 MIN: CPT | Performed by: PHYSICAL THERAPIST

## 2024-06-01 PROCEDURE — 97110 THERAPEUTIC EXERCISES: CPT | Performed by: PHYSICAL THERAPIST

## 2024-06-01 PROCEDURE — 6370000000 HC RX 637 (ALT 250 FOR IP)

## 2024-06-01 PROCEDURE — 6360000002 HC RX W HCPCS: Performed by: INTERNAL MEDICINE

## 2024-06-01 PROCEDURE — 82962 GLUCOSE BLOOD TEST: CPT

## 2024-06-01 RX ADMIN — ATORVASTATIN CALCIUM 10 MG: 10 TABLET, FILM COATED ORAL at 08:44

## 2024-06-01 RX ADMIN — COLCHICINE 0.6 MG: 0.6 TABLET, FILM COATED ORAL at 08:32

## 2024-06-01 RX ADMIN — ENOXAPARIN SODIUM 30 MG: 100 INJECTION SUBCUTANEOUS at 08:35

## 2024-06-01 RX ADMIN — SODIUM CHLORIDE, PRESERVATIVE FREE 10 ML: 5 INJECTION INTRAVENOUS at 08:38

## 2024-06-01 RX ADMIN — METOPROLOL TARTRATE 25 MG: 25 TABLET, FILM COATED ORAL at 08:33

## 2024-06-01 RX ADMIN — HYDROCHLOROTHIAZIDE 12.5 MG: 12.5 TABLET ORAL at 08:32

## 2024-06-01 RX ADMIN — MELOXICAM 7.5 MG: 7.5 TABLET ORAL at 08:33

## 2024-06-01 RX ADMIN — METOCLOPRAMIDE HYDROCHLORIDE 5 MG: 5 SOLUTION ORAL at 05:58

## 2024-06-01 RX ADMIN — DOXAZOSIN 2 MG: 1 TABLET ORAL at 08:33

## 2024-06-01 RX ADMIN — LOSARTAN POTASSIUM 50 MG: 50 TABLET, FILM COATED ORAL at 08:33

## 2024-06-01 RX ADMIN — METOCLOPRAMIDE HYDROCHLORIDE 5 MG: 5 SOLUTION ORAL at 12:14

## 2024-06-01 RX ADMIN — PANTOPRAZOLE SODIUM 40 MG: 40 INJECTION, POWDER, FOR SOLUTION INTRAVENOUS at 08:34

## 2024-06-01 RX ADMIN — ALLOPURINOL 100 MG: 100 TABLET ORAL at 08:33

## 2024-06-01 RX ADMIN — LEVOFLOXACIN 250 MG: 250 INJECTION, SOLUTION INTRAVENOUS at 08:48

## 2024-06-01 RX ADMIN — INSULIN LISPRO 4 UNITS: 100 INJECTION, SUSPENSION SUBCUTANEOUS at 08:35

## 2024-06-01 NOTE — PLAN OF CARE
Problem: Discharge Planning  Goal: Discharge to home or other facility with appropriate resources  6/1/2024 1212 by Amaya Casey RN  Outcome: Adequate for Discharge  Flowsheets (Taken 6/1/2024 0716 by Torsten Peace, RN)  Discharge to home or other facility with appropriate resources: Identify barriers to discharge with patient and caregiver  5/31/2024 2314 by Pretty Back RN  Outcome: Progressing  5/31/2024 2314 by Pretty Back RN  Outcome: Progressing     Problem: Safety - Adult  Goal: Free from fall injury  6/1/2024 1212 by Amaya Casey RN  Outcome: Adequate for Discharge  5/31/2024 2314 by Pretty Back RN  Outcome: Progressing  5/31/2024 2314 by Pretty Back RN  Outcome: Progressing     Problem: ABCDS Injury Assessment  Goal: Absence of physical injury  6/1/2024 1212 by Amaya Casey RN  Outcome: Adequate for Discharge  5/31/2024 2314 by Pretty Back RN  Outcome: Progressing  5/31/2024 2314 by Pretty Back RN  Outcome: Progressing     Problem: Chronic Conditions and Co-morbidities  Goal: Patient's chronic conditions and co-morbidity symptoms are monitored and maintained or improved  6/1/2024 1212 by Amaya Casey RN  Outcome: Adequate for Discharge  Flowsheets (Taken 6/1/2024 0716 by Torsten Peace, RN)  Care Plan - Patient's Chronic Conditions and Co-Morbidity Symptoms are Monitored and Maintained or Improved: Monitor and assess patient's chronic conditions and comorbid symptoms for stability, deterioration, or improvement  5/31/2024 2314 by Pretty Back RN  Outcome: Progressing  5/31/2024 2314 by Pretty Back RN  Outcome: Progressing

## 2024-06-01 NOTE — PROGRESS NOTES
Physical Therapy Initial Evaluation/Plan of Care    Room #:  0615/0615-01  Patient Name: Morris Mayo  YOB: 1945  MRN: [id number]    Date of Service: 6/1/2024     Tentative placement recommendation: Home with Outpatient Physical Therapy  Equipment recommendation: Hospital bed      Evaluating Physical Therapist: Leandro Tejada PT #[id number]      Specific Provider Orders/Date/Referring Provider :   PT eval and treat  Start:  05/31/24 1130,   End:  05/31/24 1130,   ONE TIME,   Standing Count:  1 Occurrences,   R       Matthew Casey, APRN - CNP    Admitting Diagnosis:   Renal mass [N28.89]  Symptomatic anemia [D64.9]      Surgery: none      Patient Active Problem List   Diagnosis    Symptomatic anemia    Severe protein-calorie malnutrition (HCC)    Renal mass        ASSESSMENT of Current Deficits Patient exhibits decreased strength, balance, and endurance impairing functional mobility, gait distance, and tolerance to activity.  Overall, Joey does well and does not appear to need SNF or home care.  He wants to go to outpatient PT for strengthening and to build endurance.  I agree.       PHYSICAL THERAPY  PLAN OF CARE       Physical therapy plan of care is established based on physician order,  patient diagnosis and clinical assessment    Current Treatment Recommendations:    -Gait: Gait training and Standing activities to improve: base of support, weight shift, weight bearing    -Endurance: Utilize Supervised activities to increase level of endurance to allow for safe functional mobility including transfers and gait   -Stairs: Stair training with instruction on proper technique and hand placement on rail    PT long term treatment goals are located in below grid    Patient and or family understand(s) diagnosis, prognosis, and plan of care.    Frequency of treatments: Patient will be seen  daily.         Prior Level of Function: Patient ambulated with wheeled walker, with rollator   Rehab Potential: good  for  baseline    Past medical history:   Past Medical History:   Diagnosis Date    Asthma     Diabetes mellitus (HCC)     Gout     Hyperlipidemia     Hypertension      Past Surgical History:   Procedure Laterality Date    IR BIOPSY KIDNEY PERCUTANEOUS  5/30/2024    IR BIOPSY KIDNEY PERCUTANEOUS 5/30/2024 [facility] CATH/IR LAB       SUBJECTIVE:    Precautions: Up with assistance,  falls risk      Social history: Patient lives with spouse in a two story home bedroom and bathroom 2nd floor full flight stairs with Rail  with 3 steps  to enter home.  Tub shower       Equipment owned: Cane, Wheeled Walker, Rollator, and Shower chair,      AM-PAC Basic Mobility       AM-PAC Basic Mobility - Inpatient   How much help is needed turning from your back to your side while in a flat bed without using bedrails?: None  How much help is needed moving from lying on your back to sitting on the side of a flat bed without using bedrails?: None  How much help is needed moving to and from a bed to a chair?: None  How much help is needed standing up from a chair using your arms?: None  How much help is needed walking in hospital room?: None  How much help is needed climbing 3-5 steps with a railing?: None  AM-PAC Inpatient Mobility Raw Score : 24  AM-PAC Inpatient T-Scale Score : 61.14  Mobility Inpatient CMS 0-100% Score: 0  Mobility Inpatient CMS G-Code Modifier :     Nursing cleared patient for PT evaluation. The admitting diagnosis and active problem list as listed above have been reviewed prior to the initiation of this evaluation.    OBJECTIVE:   Initial Evaluation  Date: 6/1/2024 Treatment Date:     Short Term/ Long Term   Goals   Was pt agreeable to Eval/treatment? Yes  To be met in 3 days   Pain level   3/10  Low back     Bed Mobility  Using rails and head of bed elevated:     Rolling: Independent    Supine to sit: Independent    Sit to supine: Independent    Scooting: Independent         Transfers Sit to stand: Independent       Ambulation     60 feet using  wheeled walker with Independent       150 feet using  wheeled walker with Independent    Stair negotiation: ascended and descended   Not assessed     10 steps, with rail, independent     ROM Within functional limits    Increase range of motion 10% of affected joints    Strength BUE:   4/5  RLE:  4/5  LLE:  4/5  Increase strength in affected mm groups by 1/3 grade   Balance Sitting EOB:  good   Dynamic Standing:  good        Patient is Alert & Oriented x person, place, time, and situation and follows directions    Sensation:  Patient  denies numbness/tingling   Edema:  no   Endurance: fair      Vitals: room air   Blood Pressure at rest  Blood Pressure during session    Heart Rate at rest 88 Heart Rate during session 93   SPO2 at rest --%  SPO2 during session --%     Patient education  Patient educated on role of Physical Therapy, risks of immobility, safety and plan of care,  importance of mobility while in hospital , and B UE and LE AROM      Patient response to education:   Pt verbalized understanding Pt demonstrated skill Pt requires further education in this area   Yes Partial Yes      Treatment:  Patient practiced and was instructed/facilitated in the following treatment: Patient  --       Therapeutic Exercises:   B UE and LE AROM   x 10 reps.       At end of session, patient in bed with call light and phone within reach,  all lines and tubes intact, nursing notified.      Patient would benefit from continued skilled Physical Therapy to improve functional independence and quality of life.         Patient's/ family goals   home    Time in  1115  Time out  1145    Total Treatment Time  10 minutes    Evaluation time includes thorough review of current medical information, gathering information on past medical history/social history and prior level of function, completion of standardized testing/informal observation of tasks, assessment of data, and development of Plan of care and

## 2024-06-01 NOTE — PLAN OF CARE
Problem: Discharge Planning  Goal: Discharge to home or other facility with appropriate resources  5/31/2024 2314 by Pretty Back RN  Outcome: Progressing  5/31/2024 0937 by Torsten Peace RN  Outcome: Progressing  Flowsheets (Taken 5/31/2024 0800)  Discharge to home or other facility with appropriate resources: Identify barriers to discharge with patient and caregiver     Problem: Safety - Adult  Goal: Free from fall injury  5/31/2024 2314 by Pretty Back RN  Outcome: Progressing  5/31/2024 0937 by Torsten Peace RN  Outcome: Progressing     Problem: ABCDS Injury Assessment  Goal: Absence of physical injury  5/31/2024 2314 by Pretty Back RN  Outcome: Progressing  5/31/2024 0937 by Torsten Peace RN  Outcome: Progressing     Problem: Chronic Conditions and Co-morbidities  Goal: Patient's chronic conditions and co-morbidity symptoms are monitored and maintained or improved  5/31/2024 2314 by Pretty Back RN  Outcome: Progressing  5/31/2024 0937 by Torsten Peace RN  Outcome: Progressing  Flowsheets (Taken 5/31/2024 0800)  Care Plan - Patient's Chronic Conditions and Co-Morbidity Symptoms are Monitored and Maintained or Improved: Monitor and assess patient's chronic conditions and comorbid symptoms for stability, deterioration, or improvement     Problem: Nutrition Deficit:  Goal: Optimize nutritional status  5/31/2024 2314 by Pretty Back RN  Outcome: Progressing  5/31/2024 0937 by Torsten Peace RN  Outcome: Progressing

## 2024-06-01 NOTE — PLAN OF CARE
Problem: Discharge Planning  Goal: Discharge to home or other facility with appropriate resources  5/31/2024 2314 by Pretty Back RN  Outcome: Progressing  5/31/2024 2314 by Pretty Back RN  Outcome: Progressing  5/31/2024 0937 by Torsten Peace RN  Outcome: Progressing  Flowsheets (Taken 5/31/2024 0800)  Discharge to home or other facility with appropriate resources: Identify barriers to discharge with patient and caregiver     Problem: Safety - Adult  Goal: Free from fall injury  5/31/2024 2314 by Pretty Back RN  Outcome: Progressing  5/31/2024 2314 by Pretty Back RN  Outcome: Progressing  5/31/2024 0937 by Torsten Peace RN  Outcome: Progressing     Problem: ABCDS Injury Assessment  Goal: Absence of physical injury  5/31/2024 2314 by Pretty Back RN  Outcome: Progressing  5/31/2024 2314 by Pretty Back RN  Outcome: Progressing  5/31/2024 0937 by Torsten Peace RN  Outcome: Progressing     Problem: Chronic Conditions and Co-morbidities  Goal: Patient's chronic conditions and co-morbidity symptoms are monitored and maintained or improved  5/31/2024 2314 by Pretty Back RN  Outcome: Progressing  5/31/2024 2314 by Pretty Back RN  Outcome: Progressing  5/31/2024 0937 by Torsten Peace RN  Outcome: Progressing  Flowsheets (Taken 5/31/2024 0800)  Care Plan - Patient's Chronic Conditions and Co-Morbidity Symptoms are Monitored and Maintained or Improved: Monitor and assess patient's chronic conditions and comorbid symptoms for stability, deterioration, or improvement     Problem: Nutrition Deficit:  Goal: Optimize nutritional status  5/31/2024 2314 by Pretty Back RN  Outcome: Progressing  5/31/2024 2314 by Pretty Back RN  Outcome: Progressing  5/31/2024 0937 by Torsten Peace RN  Outcome: Progressing

## 2024-06-01 NOTE — DISCHARGE SUMMARY
Internal Medicine Progress Note     [facility]     Paul De La O D.O., CHACHAI.                         Charanjit Romero D.O., ARGELIA Haro D.O.     Cara Gannon, MSN, APRN, NP-C  Abram Rodriguez, MSN, APRN-CNP  Matthew Casey, MSN, APRN, NP-C  Rafaela Joseph, MSN, APRN-CNP  Rhonda Martinez, MSN, APRN, NP-C       Internal Medicine  Discharge Summary    NAME: Morris Mayo  :  1945  MRN:  [id number]  PCP:Emiliano Schroeder DO  ADMITTED: 2024      DISCHARGED: 24    ADMITTING PHYSICIAN: Paul De La O DO    CONSULTANT(S):   IP CONSULT TO HEM/ONC  IP CONSULT TO UROLOGY     ADMITTING DIAGNOSIS:   Renal mass [N28.89]  Symptomatic anemia [D64.9]     DISCHARGE DIAGNOSES:   Newly diagnosed extensive right renal mass compatible with underlying malignant process with metastatic disease status post renal mass biopsy  Mildly elevated troponin compatible with demand ischemia  Atherosclerotic disease  Chronic left basal ganglia lacunar infarct  Reactive airway disease  Non-insulin-dependent diabetes mellitus type 2  Hyperlipidemia  Essential hypertension  Chronic interstitial lung disease      BRIEF HISTORY OF PRESENT ILLNESS:   Patient is 78-year-old male who presented to the ED with lightheadedness and dizziness. Patient has a history of anemia. States that recently he has been following with [facility] for anemia. States that his H&H/hemoglobin was around 5 about 3 months ago. He received iron infusions and his H&H improved to around 6.8. However he is unsure of his most recent H&H. He has followed up with GI for anemia as well as other GI issues. He states that he had EGD colonoscopy and capsule endoscopy. States that he has been having trouble with oral intake. States that if he eats more than 2 meals he is unable to keep food down. States he has been diagnosed with gastroparesis versus disorder of pyloric sphincter. He recently stopped NSAIDs and aspirin about  at 2hr, and 64% retention at 4hr (normal emptying is 37-90% retention at 1hr, 30-60% retention at 2hr, and 0-10% retention at 4hr). There is no evidence of accelerated emptying of gastric contents, with 95% retention at 1hr (rapid emptying is <30% retention at 1hr).    IMPRESSION: EVIDENCE OF DELAYED RATE OF GASTRIC EMPTYING OF SOLID MEAL. ABNORMAL STUDY: >50% RETENTION AT 4 HOURS IS CONSISTENT WITH VERY SEVERE GASTROPARESIS. Transcribe Date/Time: May 15 2024 12:46P Dictated by: JARETH TEJADA MD This examination was interpreted and the report reviewed and electronically signed by: JARETH TEJADA MD on May 15 2024 12:48PM  EST Thank you for allowing us to participate in the care of your patient. Should there be any questions regarding this interpretation, please call [phone]. If you are unable to reach us at the number above, please feel free to contact [facility] at [phone].        HOSPITAL COURSE:   Morris did well throughout the hospitalization.  Hemoglobin stabilized but the patient was found to have a large renal mass with what appears to be diffuse metastatic disease.  He underwent IR guided biopsy and we attempted to make arrangements for transfer to [facility] as recommended by the urology and oncology teams.  [place] felt this could be evaluated as an outpatient and is arranging appropriate follow-up thereafter.  The patient is otherwise resting comfortably and is acceptable for discharge.  He will follow-up with his primary care physician and arrangements are being made for follow-up in [place].  He will follow-up with urology team here locally along with the oncology team.  I reached out to his wife, Re and left a voicemail on her answering machine.  I will speak with her when she returns the telephone call.  Otherwise, he has been advised to watch for any hematuria at which point he should present back to the hospital.  He is tolerating a diet and  MG/5ML solution Take 10 mLs by mouth 3 times daily      losartan-hydrochlorothiazide (HYZAAR) 50-12.5 MG per tablet Take 1 tablet by mouth daily      simvastatin (ZOCOR) 10 MG tablet Take 10 mg by mouth nightly      nabumetone (RELAFEN) 500 MG tablet Take 500 mg by mouth 2 times daily      terazosin (HYTRIN) 2 MG capsule Take 1 capsule by mouth nightly      albuterol sulfate  (90 BASE) MCG/ACT inhaler Inhale 2 puffs into the lungs every 6 hours as needed for Wheezing      colchicine (COLCRYS) 0.6 MG tablet Take 1 tablet by mouth daily      !! insulin lispro protamine & lispro (75-25) 100 UNIT/ML SUSP injection vial Inject 4 Units into the skin 2 times daily (with meals)      !! insulin lispro protamine & lispro (75-25) 100 UNIT/ML SUSP injection vial Inject 3 Units into the skin nightly      ALLOPURINOL PO Take by mouth       !! - Potential duplicate medications found. Please discuss with provider.          FOLLOW UP/INSTRUCTIONS:  This patient is instructed to follow-up with his primary care physician.  Patient is instructed to follow-up with the consults listed above as directed by them.  he is instructed to resume home medications and take new medications as indicated in the list above.  If the patient has a recurrence of symptoms, he is instructed to go to the ED.    Preparing for this patient's discharge, including paperwork, orders, instructions, and meeting with patient did require > 40 minutes.    Charanjit Romero DO   6/1/2024  12:08 PM

## 2024-06-10 LAB — SURGICAL PATHOLOGY REPORT: NORMAL

## 2024-06-19 ENCOUNTER — HOSPITAL ENCOUNTER (EMERGENCY)
Age: 79
Discharge: HOME | End: 2024-06-19
Attending: EMERGENCY MEDICINE
Payer: MEDICARE

## 2024-06-19 VITALS
HEART RATE: 98 BPM | WEIGHT: 120 LBS | TEMPERATURE: 97.6 F | RESPIRATION RATE: 18 BRPM | BODY MASS INDEX: 19.99 KG/M2 | DIASTOLIC BLOOD PRESSURE: 55 MMHG | HEIGHT: 65 IN | OXYGEN SATURATION: 93 % | SYSTOLIC BLOOD PRESSURE: 114 MMHG

## 2024-06-19 DIAGNOSIS — C78.00 MALIGNANT NEOPLASM METASTATIC TO LUNG, UNSPECIFIED LATERALITY (HCC): Primary | ICD-10-CM

## 2024-06-19 LAB
ANION GAP SERPL CALCULATED.3IONS-SCNC: 11 MMOL/L (ref 7–16)
BASOPHILS # BLD: 0.02 K/UL (ref 0–0.2)
BASOPHILS NFR BLD: 0 % (ref 0–2)
BUN SERPL-MCNC: 40 MG/DL (ref 6–23)
CALCIUM SERPL-MCNC: 12.4 MG/DL (ref 8.6–10.2)
CHLORIDE SERPL-SCNC: 111 MMOL/L (ref 98–107)
CO2 SERPL-SCNC: 24 MMOL/L (ref 22–29)
CREAT SERPL-MCNC: 1.2 MG/DL (ref 0.7–1.2)
EOSINOPHIL # BLD: 0.2 K/UL (ref 0.05–0.5)
EOSINOPHILS RELATIVE PERCENT: 2 % (ref 0–6)
ERYTHROCYTE [DISTWIDTH] IN BLOOD BY AUTOMATED COUNT: 18.3 % (ref 11.5–15)
GFR, ESTIMATED: 63 ML/MIN/1.73M2
GLUCOSE SERPL-MCNC: 77 MG/DL (ref 74–99)
HCT VFR BLD AUTO: 28.8 % (ref 37–54)
HGB BLD-MCNC: 8.7 G/DL (ref 12.5–16.5)
IMM GRANULOCYTES # BLD AUTO: 0.06 K/UL (ref 0–0.58)
IMM GRANULOCYTES NFR BLD: 1 % (ref 0–5)
LYMPHOCYTES NFR BLD: 0.83 K/UL (ref 1.5–4)
LYMPHOCYTES RELATIVE PERCENT: 10 % (ref 20–42)
MCH RBC QN AUTO: 28.2 PG (ref 26–35)
MCHC RBC AUTO-ENTMCNC: 30.2 G/DL (ref 32–34.5)
MCV RBC AUTO: 93.2 FL (ref 80–99.9)
MONOCYTES NFR BLD: 0.93 K/UL (ref 0.1–0.95)
MONOCYTES NFR BLD: 11 % (ref 2–12)
NEUTROPHILS NFR BLD: 76 % (ref 43–80)
NEUTS SEG NFR BLD: 6.45 K/UL (ref 1.8–7.3)
PLATELET # BLD AUTO: 119 K/UL (ref 130–450)
PMV BLD AUTO: 9.4 FL (ref 7–12)
POTASSIUM SERPL-SCNC: 3.8 MMOL/L (ref 3.5–5)
RBC # BLD AUTO: 3.09 M/UL (ref 3.8–5.8)
SODIUM SERPL-SCNC: 146 MMOL/L (ref 132–146)
WBC OTHER # BLD: 8.5 K/UL (ref 4.5–11.5)

## 2024-06-19 PROCEDURE — 96360 HYDRATION IV INFUSION INIT: CPT

## 2024-06-19 PROCEDURE — 99284 EMERGENCY DEPT VISIT MOD MDM: CPT

## 2024-06-19 PROCEDURE — 85025 COMPLETE CBC W/AUTO DIFF WBC: CPT

## 2024-06-19 PROCEDURE — 2580000003 HC RX 258: Performed by: EMERGENCY MEDICINE

## 2024-06-19 PROCEDURE — 80048 BASIC METABOLIC PNL TOTAL CA: CPT

## 2024-06-19 RX ORDER — 0.9 % SODIUM CHLORIDE 0.9 %
1000 INTRAVENOUS SOLUTION INTRAVENOUS ONCE
Status: COMPLETED | OUTPATIENT
Start: 2024-06-19 | End: 2024-06-19

## 2024-06-19 RX ADMIN — SODIUM CHLORIDE 1000 ML: 9 INJECTION, SOLUTION INTRAVENOUS at 12:40

## 2024-06-19 ASSESSMENT — LIFESTYLE VARIABLES
HOW MANY STANDARD DRINKS CONTAINING ALCOHOL DO YOU HAVE ON A TYPICAL DAY: PATIENT DOES NOT DRINK
HOW OFTEN DO YOU HAVE A DRINK CONTAINING ALCOHOL: NEVER

## 2024-06-19 ASSESSMENT — PAIN - FUNCTIONAL ASSESSMENT: PAIN_FUNCTIONAL_ASSESSMENT: NONE - DENIES PAIN

## 2024-06-19 NOTE — ED PROVIDER NOTES
78-year-old male presenting from home.  He has failure to thrive, he is lost over 50 pounds last couple of months.  Recently diagnosed with kidney cancer.  He is very weak and has not been able to eat or drink anything at home lately.  Is scheduled for immunotherapy in [place], he does not qualify for chemotherapy or radiation or surgery based on how severely ill he is.  Wife reports that he has lesions above the diaphragm into the lungs as well.  He does have a living will at this point.  I spoke with the wife regarding goals of care and we will transition to hospice and comfort measures only.      History reviewed. No pertinent family history.  Past Surgical History:   Procedure Laterality Date    IR BIOPSY KIDNEY PERCUTANEOUS  5/30/2024    IR BIOPSY KIDNEY PERCUTANEOUS 5/30/2024 [facility] CATH/IR LAB       Review of Systems   Unable to perform ROS: Acuity of condition   Constitutional:  Positive for fatigue.   Neurological:  Positive for weakness.        Physical Exam  Constitutional:       General: He is not in acute distress.     Appearance: He is well-developed.      Comments: Frail, cachectic, slow to respond   HENT:      Head: Normocephalic and atraumatic.      Mouth/Throat:      Mouth: Mucous membranes are dry.   Eyes:      Pupils: Pupils are equal, round, and reactive to light.   Neck:      Thyroid: No thyromegaly.   Cardiovascular:      Rate and Rhythm: Normal rate and regular rhythm.   Pulmonary:      Effort: Pulmonary effort is normal. No respiratory distress.      Breath sounds: Normal breath sounds. No wheezing.   Abdominal:      General: There is no distension.      Palpations: Abdomen is soft. There is no mass.      Tenderness: There is no abdominal tenderness. There is no guarding or rebound.   Musculoskeletal:         General: No tenderness. Normal range of motion.      Cervical back: Normal range of motion and neck supple.      Comments: Significant muscle atrophy and weight loss   Skin:    medications have been reviewed.    Allergies: Terramycin [oxytetracycline] and Pcn [penicillins]    -------------------------------------------------- RESULTS -------------------------------------------------  Labs:  Results for orders placed or performed during the hospital encounter of 06/19/24   CBC with Auto Differential   Result Value Ref Range    WBC 8.5 4.5 - 11.5 k/uL    RBC 3.09 (L) 3.80 - 5.80 m/uL    Hemoglobin 8.7 (L) 12.5 - 16.5 g/dL    Hematocrit 28.8 (L) 37.0 - 54.0 %    MCV 93.2 80.0 - 99.9 fL    MCH 28.2 26.0 - 35.0 pg    MCHC 30.2 (L) 32.0 - 34.5 g/dL    RDW 18.3 (H) 11.5 - 15.0 %    Platelets 119 (L) 130 - 450 k/uL    MPV 9.4 7.0 - 12.0 fL    Neutrophils % 76 43.0 - 80.0 %    Lymphocytes % 10 (L) 20.0 - 42.0 %    Monocytes % 11 2.0 - 12.0 %    Eosinophils % 2 0 - 6 %    Basophils % 0 0.0 - 2.0 %    Immature Granulocytes % 1 0.0 - 5.0 %    Neutrophils Absolute 6.45 1.80 - 7.30 k/uL    Lymphocytes Absolute 0.83 (L) 1.50 - 4.00 k/uL    Monocytes Absolute 0.93 0.10 - 0.95 k/uL    Eosinophils Absolute 0.20 0.05 - 0.50 k/uL    Basophils Absolute 0.02 0.00 - 0.20 k/uL    Immature Granulocytes Absolute 0.06 0.00 - 0.58 k/uL   Basic Metabolic Panel   Result Value Ref Range    Sodium 146 132 - 146 mmol/L    Potassium 3.8 3.5 - 5.0 mmol/L    Chloride 111 (H) 98 - 107 mmol/L    CO2 24 22 - 29 mmol/L    Anion Gap 11 7 - 16 mmol/L    Glucose 77 74 - 99 mg/dL    BUN 40 (H) 6 - 23 mg/dL    Creatinine 1.2 0.70 - 1.20 mg/dL    EstDb Filt Rate 63 >60 mL/min/1.73m2    Calcium 12.4 (H) 8.6 - 10.2 mg/dL       Radiology:  No orders to display       ------------------------- NURSING NOTES AND VITALS REVIEWED ---------------------------  Date / Time Roomed:  6/19/2024 12:25 PM  ED Bed Assignment:  OCTAVIO/OCTAVIO    The nursing notes within the ED encounter and vital signs as below have been reviewed.   BP (!) 114/55   Pulse 98   Temp 97.6 °F (36.4 °C) (Axillary)   Resp 18   Ht 1.651 m (5' 5\")   Wt 54.4 kg (120 lb)

## 2024-06-19 NOTE — CARE COORDINATION
SS Note: Pt here for failure to thrive, he is lost over 50 pounds last couple of months. Recently diagnosed with kidney cancer. Pt is scheduled for immunotherapy this Friday in [place], he does not qualify for chemotherapy or radiation, or surgery based on how severely ill he is. Pt's wife notes he is not going to make that appointment. Goals of care were reviewed by Dr. Ludwig & pt is to transition to hospice and comfort measures only. Dr. Ludwig completed DNR/CC.    FARHAN spoke to pt & his wife. Pt lives w/wife, he has a Rollator, wheeled walker, cane and toilet seat riser. Pt also has hospital bed. SW discussed hospice services and pt's wife is familiar with this service. She was given a list by this SW as she cannot recall name the agency 2 of their family members used. She will let SW know who she wants to use. Pt is not talking but can hear & understand and shakes his head or makes hand gestures.     1410  FARHAN set up transport via stretcher- [facility]. ETA is 1515 to 1545. FARHAN notified pt and his wife. Pt's wife is not sure of hospice agency she wants to use. She will check w/family once she gets home & call SW w/name. FARHAN provided her w/business card.    1505  Pt's wife went home and called FARHAN. She reports she wants [facility]. FARHAN called [facility] and informed referral already completed by [facility]. [facility] was waiting till pt's Friday apt. @ [facility]. FARHAN noted he is not going now. FARHAN informed staff will be down to talk to pt. FARHAN did note pt's wife is @ home and pt to be picked up anytime now- ETA is now.     1520  FARHAN received call from [employer]. There is a liaison @ [facility] & one @ [place]. They will not be able to see pt today & someone will call pt's wife tomorrow. FARHAN called pt's wife and informed her. She noted that is fine. FARHAN updated [facility].   Electronically signed by KRIS Callejas on 6/19/2024 at 3:28 PM